# Patient Record
Sex: FEMALE | Race: WHITE | NOT HISPANIC OR LATINO | Employment: FULL TIME | URBAN - METROPOLITAN AREA
[De-identification: names, ages, dates, MRNs, and addresses within clinical notes are randomized per-mention and may not be internally consistent; named-entity substitution may affect disease eponyms.]

---

## 2017-06-23 ENCOUNTER — OFFICE VISIT (OUTPATIENT)
Dept: FAMILY MEDICINE | Facility: CLINIC | Age: 42
End: 2017-06-23

## 2017-06-23 VITALS
HEIGHT: 64 IN | BODY MASS INDEX: 31.24 KG/M2 | DIASTOLIC BLOOD PRESSURE: 89 MMHG | SYSTOLIC BLOOD PRESSURE: 132 MMHG | WEIGHT: 183 LBS | HEART RATE: 92 BPM | TEMPERATURE: 97.5 F

## 2017-06-23 DIAGNOSIS — E03.9 HYPOTHYROIDISM, UNSPECIFIED TYPE: ICD-10-CM

## 2017-06-23 LAB
HEMOGLOBIN: 13.5 G/DL (ref 11.7–15.7)
TSH SERPL DL<=0.05 MIU/L-ACNC: 0.21 UIU/ML (ref 0.3–5)

## 2017-06-23 RX ORDER — CEPHALEXIN 500 MG/1
500 CAPSULE ORAL 2 TIMES DAILY
Qty: 20 CAPSULE | Refills: 0 | Status: SHIPPED | OUTPATIENT
Start: 2017-06-23 | End: 2017-12-28

## 2017-06-23 NOTE — PROGRESS NOTES
Preceptor attestation:  Patient seen and discussed with the resident. I examined patient, was present for and supervised the entire procedure.  Assessment and plan reviewed with resident and agreed upon.  Supervising physician: Brenden Penny  [facility]

## 2017-06-23 NOTE — MR AVS SNAPSHOT
After Visit Summary   6/23/2017    Tiffanie Rosas    MRN: [id number]           Patient Information     Date Of Birth          1975        Visit Information        Provider Department      6/23/2017 4:30 PM Chica Ramos DO [facility]        Today's Diagnoses     Abscess, abdomen (H)    -  1      Care Instructions      Abscess (Incision & Drainage)  An abscess is sometimes called a boil. It happens when bacteria get trapped under the skin and start to grow. Pus forms inside the abscess as the body responds to the bacteria. An abscess can happen with an insect bite, ingrown hair, blocked oil gland, pimple, cyst, or puncture wound.  Your healthcare provider has drained the pus from your abscess. If the abscess pocket was large, your healthcare provider may have put in gauze packing. Your provider will need to remove it on your next visit. He or she may also replace it at that time. You may not need antibiotics to treat a simple abscess, unless the infection is spreading into the skin around the wound (cellulitis).  The wound will take about 1 to 2 weeks to heal, depending on the size of the abscess. Healthy tissue will grow from the bottom and sides of the opening until it seals over.  Home care  These tips can help your wound heal:    The wound may drain for the first 2 days. Cover the wound with a clean dry dressing. Change the dressing if it becomes soaked with blood or pus.    If a gauze packing was placed inside the abscess pocket, you may be told to remove it yourself. You may do this in the shower. Once the packing is removed, you should wash the area in the shower, or clean the area as directed by your provider. Continue to do this until the skin opening has closed. Make sure you wash your hands after changing the packing or cleaning the wound.    If you were prescribed antibiotics, take them as directed until they are all gone.    You may use acetaminophen or ibuprofen to control  pain, unless another pain medicine was prescribed. If you have liver disease or ever had a stomach ulcer, talk with your doctor before using these medicines.  Follow-up care  Follow up with your healthcare provider, or as advised. If a gauze packing was put in your wound, it should be removed in 1 to 2 days. Check your wound every day for any signs that the infection is getting worse. The signs are listed below.  When to seek medical advice  Call your healthcare provider right away if any of these occur:    Increasing redness or swelling    Red streaks in the skin leading away from the wound    Increasing local pain or swelling    Continued pus draining from the wound 2 days after treatment    Fever of 100.4 F (38 C) or higher, or as directed by your healthcare provider    Boil returns when you are at home  Date Last Reviewed: 9/1/2016 2000-2017 [employer]. [address] [place] [address]. All rights reserved. This information is not intended as a substitute for professional medical care. Always follow your healthcare professional's instructions.                Follow-ups after your visit        Who to contact     Please call your clinic at [phone] to:    Ask questions about your health    Make or cancel appointments    Discuss your medicines    Learn about your test results    Speak to your doctor   If you have compliments or concerns about an experience at your clinic, or if you wish to file a complaint, please contact [facility] Patient Relations at [phone] or email us at [email or web address]         Additional Information About Your Visit        MyChart Information     [employer] gives you secure access to your electronic health record. If you see a primary care provider, you can also send messages to your care team and make appointments. If you have questions, please call your primary care clinic.  If you do not have a primary care  "provider, please call [phone] and they will assist you.      [employer] is an electronic gateway that provides easy, online access to your medical records. With [employer], you can request a clinic appointment, read your test results, renew a prescription or communicate with your care team.     To access your existing account, please contact your [facility] or call [phone] for assistance.        Care EveryWhere ID     This is your Care EveryWhere ID. This could be used by other organizations to access your [facility] medical records  [id number]        Your Vitals Were     Pulse Temperature Height Last Period BMI (Body Mass Index)       92 97.5  F (36.4  C) (Oral) 5' 4\" (162.6 cm) (LMP Unknown) 31.41 kg/m2        Blood Pressure from Last 3 Encounters:   06/23/17 132/89   10/07/16 106/75   10/06/16 113/79    Weight from Last 3 Encounters:   06/23/17 183 lb (83 kg)   10/06/16 193 lb 9.6 oz (87.8 kg)   11/12/15 195 lb 1.6 oz (88.5 kg)              Today, you had the following     No orders found for display         Today's Medication Changes          These changes are accurate as of: 6/23/17  4:47 PM.  If you have any questions, ask your nurse or doctor.               Start taking these medicines.        Dose/Directions    cephALEXin 500 MG capsule   Commonly known as:  KEFLEX   Used for:  Abscess, abdomen (H)   Started by:  Chica Ramos DO        Dose:  500 mg   Take 1 capsule (500 mg) by mouth 2 times daily   Quantity:  20 capsule   Refills:  0            Where to get your medicines      These medications were sent to [facility] [address] [place] [address]     Phone:  [phone]     cephALEXin 500 MG capsule                Primary Care Provider Office Phone # Fax #    Carla Nicholas -[phone]       [address] [address]        Equal Access to Services     BEATRIS HUTSON AH: [place] bernal " kedar Patel, wajamaalda lurickadaha, qaybta kajunior sebastian, thomas jean-pierrein hayaan chasebrittani fadumoyadira laMichaelcarlyn dexter. So [facility] [phone].    ATENCIÓN: Si akuala shawn, tiene a phillip disposición servicios gratuitos de asistencia lingüística. Dg al [phone].    We comply with applicable federal civil rights laws and [place] laws. We do not discriminate on the basis of race, color, national origin, age, disability sex, sexual orientation or gender identity.            Thank you!     Thank you for choosing [facility]  for your care. Our goal is always to provide you with excellent care. Hearing back from our patients is one way we can continue to improve our services. Please take a few minutes to complete the written survey that you may receive in the mail after your visit with us. Thank you!             Your Updated Medication List - Protect others around you: Learn how to safely use, store and throw away your medicines at www.disposemymeds.org.          This list is accurate as of: 6/23/17  4:47 PM.  Always use your most recent med list.                   Brand Name Dispense Instructions for use Diagnosis    B-12 1000 MCG Tbcr     100 tablet    Take 1,000 mcg by mouth daily    Vitamin B12 deficiency       cephALEXin 500 MG capsule    KEFLEX    20 capsule    Take 1 capsule (500 mg) by mouth 2 times daily    Abscess, abdomen (H)       cholecalciferol 1000 UNIT tablet    vitamin D    100 tablet    Take 1 tablet (1,000 Units) by mouth daily    Vitamin D deficiency       levonorgestrel 20 MCG/24HR IUD    MIRENA     1 each (20 mcg) by Intrauterine route continuous    Encounter for insertion of mirena IUD       levothyroxine 200 MCG tablet    SYNTHROID/LEVOTHROID    90 tablet    Take 1 tablet (200 mcg) by mouth daily    Other specified hypothyroidism       naproxen 500 MG tablet    NAPROSYN    60 tablet    Take 1 tablet (500 mg) by mouth 2 times daily as needed for moderate pain    Hand joint pain, unspecified laterality

## 2017-06-23 NOTE — PROGRESS NOTES
SUBJECTIVE:  41 year old female presents with abscess formation on right lower abdomen for 3-4 days.  No   fever or chills, palpitations, syncope or SOB.  No history of diabetes. No recent trauma, scratches, wounds or bites prior to abscess formation.    OBJECTIVE:  Fluctuant abscess noted on the right lower abdomen.  Size 0wdx1pl. There is   surrounding induration, erythema and tenderness. Afebrile. Vitals otherwise stable.    ASSESSMENT:  Sebaceous cyst with abscess formation.    PLAN:  After informed consent was obtained, using Betadine for cleansing   and 1% Lidocaine for anesthetic, with sterile   technique, an incision was made into the abscess cavity which was   then drained of purulent material.  The cavity was packed with iodoform guaze.   Procedure well tolerated.  Dressing applied and wound care instructions provided.   May remove packing in 48 hours, and continue frequent warm tub soaks   until abscess resolves. Return to clinic for recheck in 3 days, call sooner in the event of fevers/chills, palpitations, worsening pain.  She was started on keflex 500 BID x10d.    Chica Ramos DO  Procedure supervised by Dr. Brenden Penny

## 2017-06-23 NOTE — PATIENT INSTRUCTIONS
Abscess (Incision & Drainage)  An abscess is sometimes called a boil. It happens when bacteria get trapped under the skin and start to grow. Pus forms inside the abscess as the body responds to the bacteria. An abscess can happen with an insect bite, ingrown hair, blocked oil gland, pimple, cyst, or puncture wound.  Your healthcare provider has drained the pus from your abscess. If the abscess pocket was large, your healthcare provider may have put in gauze packing. Your provider will need to remove it on your next visit. He or she may also replace it at that time. You may not need antibiotics to treat a simple abscess, unless the infection is spreading into the skin around the wound (cellulitis).  The wound will take about 1 to 2 weeks to heal, depending on the size of the abscess. Healthy tissue will grow from the bottom and sides of the opening until it seals over.  Home care  These tips can help your wound heal:    The wound may drain for the first 2 days. Cover the wound with a clean dry dressing. Change the dressing if it becomes soaked with blood or pus.    If a gauze packing was placed inside the abscess pocket, you may be told to remove it yourself. You may do this in the shower. Once the packing is removed, you should wash the area in the shower, or clean the area as directed by your provider. Continue to do this until the skin opening has closed. Make sure you wash your hands after changing the packing or cleaning the wound.    If you were prescribed antibiotics, take them as directed until they are all gone.    You may use acetaminophen or ibuprofen to control pain, unless another pain medicine was prescribed. If you have liver disease or ever had a stomach ulcer, talk with your doctor before using these medicines.  Follow-up care  Follow up with your healthcare provider, or as advised. If a gauze packing was put in your wound, it should be removed in 1 to 2 days. Check your wound every day for any  signs that the infection is getting worse. The signs are listed below.  When to seek medical advice  Call your healthcare provider right away if any of these occur:    Increasing redness or swelling    Red streaks in the skin leading away from the wound    Increasing local pain or swelling    Continued pus draining from the wound 2 days after treatment    Fever of 100.4 F (38 C) or higher, or as directed by your healthcare provider    Boil returns when you are at home  Date Last Reviewed: 9/1/2016 2000-2017 [employer]. [address] [place] [address], [place] [id number]. All rights reserved. This information is not intended as a substitute for professional medical care. Always follow your healthcare professional's instructions.

## 2017-06-26 ENCOUNTER — OFFICE VISIT (OUTPATIENT)
Dept: FAMILY MEDICINE | Facility: CLINIC | Age: 42
End: 2017-06-26

## 2017-06-26 VITALS — TEMPERATURE: 98 F | HEART RATE: 87 BPM | SYSTOLIC BLOOD PRESSURE: 119 MMHG | DIASTOLIC BLOOD PRESSURE: 85 MMHG

## 2017-06-26 DIAGNOSIS — E03.9 HYPOTHYROIDISM, UNSPECIFIED TYPE: ICD-10-CM

## 2017-06-26 DIAGNOSIS — L02.818 CUTANEOUS ABSCESS OF OTHER SITE: Primary | ICD-10-CM

## 2017-06-26 DIAGNOSIS — H69.92 ETD (EUSTACHIAN TUBE DYSFUNCTION), LEFT: ICD-10-CM

## 2017-06-26 NOTE — MR AVS SNAPSHOT
After Visit Summary   6/26/2017    Tiffanie Rosas    MRN: [id number]           Patient Information     Date Of Birth          1975        Visit Information        Provider Department      6/26/2017 11:20 AM Chica Ramos DO [facility]        Today's Diagnoses     Cutaneous abscess of other site    -  1    ETD (eustachian tube dysfunction), left        Hypothyroidism, unspecified type           Follow-ups after your visit        Who to contact     Please call your clinic at [phone] to:    Ask questions about your health    Make or cancel appointments    Discuss your medicines    Learn about your test results    Speak to your doctor   If you have compliments or concerns about an experience at your clinic, or if you wish to file a complaint, please contact [facility] Patient Relations at [phone] or email us at [email or web address]         Additional Information About Your Visit        MyChart Information     [employer] gives you secure access to your electronic health record. If you see a primary care provider, you can also send messages to your care team and make appointments. If you have questions, please call your primary care clinic.  If you do not have a primary care provider, please call [phone] and they will assist you.      [employer] is an electronic gateway that provides easy, online access to your medical records. With [employer], you can request a clinic appointment, read your test results, renew a prescription or communicate with your care team.     To access your existing account, please contact your [facility] or call [phone] for assistance.        Care EveryWhere ID     This is your Care EveryWhere ID. This could be used by other organizations to access your [place] medical records  [id number]        Your Vitals Were     Pulse Temperature Last Period             87 98  F (36.7  C) (Oral) (LMP  Unknown)          Blood Pressure from Last 3 Encounters:   06/26/17 119/85   06/23/17 132/89   10/07/16 106/75    Weight from Last 3 Encounters:   06/23/17 183 lb (83 kg)   10/06/16 193 lb 9.6 oz (87.8 kg)   11/12/15 195 lb 1.6 oz (88.5 kg)              Today, you had the following     No orders found for display       Primary Care Provider Office Phone # Fax #    Carla Nicholas -[phone]       [address] [address]        Equal Access to Services     [facility]: Hadii david bernal hadasho Soomaali, waaxda luqadaha, qaybta kaalmada romulo, thomas torres . So [facility] [phone].    ATENCIÓN: Si habla español, tiene a phillip disposición servicios gratuitos de asistencia lingüística. Ll[facility] [phone].    We comply with applicable federal civil rights laws and [place] laws. We do not discriminate on the basis of race, color, national origin, age, disability sex, sexual orientation or gender identity.            Thank you!     Thank you for choosing [facility]  for your care. Our goal is always to provide you with excellent care. Hearing back from our patients is one way we can continue to improve our services. Please take a few minutes to complete the written survey that you may receive in the mail after your visit with us. Thank you!             Your Updated Medication List - Protect others around you: Learn how to safely use, store and throw away your medicines at www.disposemymeds.org.          This list is accurate as of: 6/26/17 11:44 AM.  Always use your most recent med list.                   Brand Name Dispense Instructions for use Diagnosis    B-12 1000 MCG Tbcr     100 tablet    Take 1,000 mcg by mouth daily    Vitamin B12 deficiency       cephALEXin 500 MG capsule    KEFLEX    20 capsule    Take 1 capsule (500 mg) by mouth 2 times daily    Abscess, abdomen (H)       cholecalciferol 1000 UNIT tablet    vitamin D    100 tablet    Take 1  tablet (1,000 Units) by mouth daily    Vitamin D deficiency       levonorgestrel 20 MCG/24HR IUD    MIRENA     1 each (20 mcg) by Intrauterine route continuous    Encounter for insertion of mirena IUD       levothyroxine 200 MCG tablet    SYNTHROID/LEVOTHROID    90 tablet    Take 1 tablet (200 mcg) by mouth daily    Other specified hypothyroidism       naproxen 500 MG tablet    NAPROSYN    60 tablet    Take 1 tablet (500 mg) by mouth 2 times daily as needed for moderate pain    Hand joint pain, unspecified laterality

## 2017-06-26 NOTE — PROGRESS NOTES
Preceptor attestation:  Patient seen and discussed with the resident. Assessment and plan reviewed with resident and agreed upon.  Supervising physician: Tim Sheridan  [facility]

## 2017-06-26 NOTE — PROGRESS NOTES
"Subjective:   Tiffanie Rosas is a 41 year old female with PMHx of  Patient Active Problem List    Diagnosis Date Noted     Carpal tunnel syndrome 07/14/2014     Priority: Medium     EMG on 7/7/14 with Dr. Montano. Moderate bilateral carpal tunnel.        Anemia 06/26/2014     Priority: Medium     Alcoholism in remission (H) 10/23/2013     Priority: Medium     Obesity 12/04/2012     Priority: Medium     Nicotine addiction 12/04/2012     Priority: Medium     S/P gastric bypass 07/01/2011     Priority: Medium     CARDIOVASCULAR SCREENING; LDL GOAL LESS THAN 160 07/01/2011     Priority: Medium     Hypothyroidism 07/01/2011     Priority: Medium       who presents for recheck. I saw her 3 days ago for a skin abscess on her right lower abdomen. I&D was performed at that time and packing was placed. She was also started on keflex 500 BID. Today she tells me the abscess is much improved and the packing fell out on it's own yesterday. She is tolerating the keflex well and the redness surrounding the abscess has resolved. Denies fevers/chills, palpitations, diaphoresis.     Repeat TSH check last visit was low at 0.2, down from 0.4 8 mos earlier. She is on 200 mcg daily. Again denies palpitations, diaphoresis, insomnia or hair loss.     Her left ear occasionally feels \"plugged\". No other recent illness. No hearing loss.    PMHX/PSHX/MEDS/ALLERGIES/SHX/FHX reviewed and updated in Epic.    Objective:   /85  Pulse 87  Temp 98  F (36.7  C) (Oral)  LMP  (LMP Unknown), There is no height or weight on file to calculate BMI.  Constitutional: healthy, a&o, nad  HEENT: nc/at, PER, neck supple, ENT exam normal   Skin: no suspicious lesions or rashes, I&D site on right lower abdomen is healing appropriately, the previous surrounding erythema has resolved. Wound is clean and dry and packing is no longer present. There is no longer any surrounding induration or fluctuance.  Psychiatric: mentation appears normal and affect " normal/bright     Results for orders placed or performed in visit on 06/23/17   TSH  Sensitive ([facility])   Result Value Ref Range    TSH 0.21 (L) 0.30 - 5.00 uIU/mL    Narrative    Test performed by:  [facility]  [address] [place] [address]   Hemoglobin (HGB) ([facility])   Result Value Ref Range    Hemoglobin 13.5 11.7 - 15.7 g/dL       Assesment & Plan:   Tiffanie Rosas, 41 year old female, with history of I&D for abscess 3 days ago here for recheck.    (L02.818) Cutaneous abscess of other site  (primary encounter diagnosis)  Comment: healing appropriately.  Plan: complete course of keflex    (H69.82) ETD (eustachian tube dysfunction), left  Plan: continue to monitor for now    (E03.9) Hypothyroidism, unspecified type  Comment/Plan: recent TSH slightly low. Will keep synthroid dose the same for now as she denies sx of hyperthyroidism but would consider decreasing to 175 mcg after recheck in 6 weeks if still <1.      I ended our visit today by discussing the patient's diagnoses and recommended treatment. Please refer to today's diagnoses and orders for further details. I briefly discussed the pathophysiology of these conditions and outlined their expected course. I discussed the warning symptoms and signs that indicate an atypical course that would need urgent or emergent care. I also discussed self care strategies for symptom relief. Patient voiced complete understanding of plan of care and was in full agreement to proceed as discussed.    RTC in 6 weeks, for follow up of hypothyroidism or sooner if develops new or worsening symptoms.      Preceptor: Dr. Fatimah Ramos PGY-3

## 2017-07-29 ENCOUNTER — HEALTH MAINTENANCE LETTER (OUTPATIENT)
Age: 42
End: 2017-07-29

## 2017-12-26 DIAGNOSIS — E03.8 OTHER SPECIFIED HYPOTHYROIDISM: ICD-10-CM

## 2017-12-26 RX ORDER — LEVOTHYROXINE SODIUM 200 UG/1
200 TABLET ORAL DAILY
Qty: 30 TABLET | Refills: 0 | Status: SHIPPED | OUTPATIENT
Start: 2017-12-26 | End: 2018-01-05

## 2017-12-26 NOTE — TELEPHONE ENCOUNTER
[facility] phone call message- patient requesting a refill:    Full Medication Name: LEVOTHYROXINE    Dose: 200MG    Pharmacy confirmed as   [facility] [address] [place] [id number] [address] [address]  Phone: [phone] Fax: [phone]  : No: SHE MOVED PLEASE USE [facility] IN [facility] PHONE NUMBER [phone]    Additional Comments: She has been out for three days     OK to leave a message on voice mail? Yes    Primary language: English      needed? No    Call taken on December 26, 2017 at 11:23 AM by Erendira Truong

## 2017-12-28 ENCOUNTER — OFFICE VISIT (OUTPATIENT)
Dept: FAMILY MEDICINE | Facility: CLINIC | Age: 42
End: 2017-12-28
Payer: COMMERCIAL

## 2017-12-28 VITALS
HEIGHT: 65 IN | DIASTOLIC BLOOD PRESSURE: 86 MMHG | BODY MASS INDEX: 30.62 KG/M2 | WEIGHT: 183.8 LBS | HEART RATE: 74 BPM | SYSTOLIC BLOOD PRESSURE: 119 MMHG | OXYGEN SATURATION: 98 % | TEMPERATURE: 98.3 F

## 2017-12-28 DIAGNOSIS — E03.8 OTHER SPECIFIED HYPOTHYROIDISM: Primary | ICD-10-CM

## 2017-12-28 DIAGNOSIS — E03.9 HYPOTHYROIDISM, UNSPECIFIED TYPE: Primary | ICD-10-CM

## 2017-12-28 LAB — TSH SERPL DL<=0.05 MIU/L-ACNC: 0.26 UIU/ML (ref 0.3–5)

## 2017-12-28 NOTE — PATIENT INSTRUCTIONS
- Will follow up next week with TSH results, and discuss instructions on adjusted prescription if necessary. Thanks!

## 2017-12-28 NOTE — PROGRESS NOTES
"[facility] Medicine Clinic Visit    Subjective:  Tiffanie Rosas is a 42 year old female with a PMHx significant for   Patient Active Problem List   Diagnosis     S/P gastric bypass     CARDIOVASCULAR SCREENING; LDL GOAL LESS THAN 160     Hypothyroidism     Obesity     Nicotine addiction     Alcoholism in remission (H)     Anemia     Carpal tunnel syndrome    who presents with concerns about her thyroid prescription.     # Hypothyroidism: She recently requested a refill of her longstanding Synthroid prescription, and was asked to follow up in clinic due to a TSH of 0.21 on 6/23/17. Considered decreasing her dose at that time but this was not followed up on. Currently taking 200mcg daily and tolerating well. Denies any symptoms or concerns, including anxiety, restlessness, palpitations, abdominal issues, vision changes, fatigue, weakness, constipation, hair changes, dyspnea, edema, mood concerns. Of note, she's had a 30 lb weight loss since 2014 which is partly intentional from dietary changes and related to being more active at work (at [employer]). Menses haven't changed though she only has intermittent spotting from her Mirena (placed in 10/2014). Was diagnosed with hypothyroidism at onset of puberty; no known antibody results. No FHx of autoimmune diseases.     Preventative care is UTD.     ROS:   Gen: No fevers, chills. +weight loss  HEENT: No headache, vision changes, hearing loss, swallowing problems   CV: No chest pain, palpitations, peripheral edema  Resp: No SOB, cough, wheezing, congestion, coryza  GI: No constipation, diarrhea, nausea, vomiting, heartburn, change in bowel habits  : No dysuria, hematuria, discharge  MSK: No arthralgias, myalgias  Skin: No rash, lesions    Objective:  Vitals:    12/28/17 1139   BP: 119/86   Pulse: 74   Temp: 98.3  F (36.8  C)   TempSrc: Oral   SpO2: 98%   Weight: 183 lb 12.8 oz (83.4 kg)   Height: 5' 4.5\" (163.8 cm)     Body mass index is 31.06 kg/(m^2).    GEN: NAD, " healthy, alert  EYES: grossly normal to inspection, PERRL, EOMI, normal conjunctivae/sclerae  HENT: normal ear canals/TM's, nose & mouth w/o ulcers or lesions, clear oropharynx, MMM  NECK: no LAD, asymmetry, masses, scars; thyroid normal to palpation  RESP: CTAB, no w/r/r  CV: RRR, nl S1/S2, no S3/S4, no m/r/g, no peripheral edema, peripheral pulses strong  ABD: soft, NT/ND, no hepatosplenomegaly, no masses, no rebound, +BS throughout  MSK: no MSK defects noted, gait is age appropriate w/o ataxia  SKIN: no suspicious lesions or rashes  NEURO: normal strength and tone, sensory exam grossly normal, mentation intact, speech normal  PSYCH: mentation appears normal, affect normal/bright    TSH (12/28/17): 0.26     Assessment/Plan:  Tiffanie was seen today for refill request.    Diagnoses and all orders for this visit:    Other specified hypothyroidism. TSH today remains low. Asymptomatic but does have significant weight loss (though partly intentional). Patient requests a follow up appointment next week to discuss further plans, no changes made today. Will consider adjusting Levothyroxine at that time to achieve euthyroid state. If so, will ask patient to RTC in 4-6 weeks to recheck TSH and retitrate if indicated.     Options for treatment and follow-up care were reviewed with the patient who was engaged and actively involved in the decision making process, verbalized understanding of the options discussed, and satisfied with the final plan.    Patient was staffed with supervising physician, Dr. Rasmussen.     Ray Kebede MD, PGY-1  [facility]

## 2017-12-28 NOTE — MR AVS SNAPSHOT
After Visit Summary   12/28/2017    Tiffanie Rosas    MRN: [id number]           Patient Information     Date Of Birth          1975        Visit Information        Provider Department      12/28/2017 11:20 AM Ray Kebede MD [facility]        Today's Diagnoses     Other specified hypothyroidism    -  1      Care Instructions    - Will call with TSH results in the next few days, and with instructions on adjusted prescription if necessary. Thanks!          Follow-ups after your visit        Who to contact     Please call your clinic at [phone] to:    Ask questions about your health    Make or cancel appointments    Discuss your medicines    Learn about your test results    Speak to your doctor   If you have compliments or concerns about an experience at your clinic, or if you wish to file a complaint, please contact [facility] Patient Relations at [phone] or email us at [email or web address]         Additional Information About Your Visit        MyChart Information     Domot gives you secure access to your electronic health record. If you see a primary care provider, you can also send messages to your care team and make appointments. If you have questions, please call your primary care clinic.  If you do not have a primary care provider, please call [phone] and they will assist you.      Access Closure is an electronic gateway that provides easy, online access to your medical records. With Access Closure, you can request a clinic appointment, read your test results, renew a prescription or communicate with your care team.     To access your existing account, please contact your [facility] Clinic or call [phone] for assistance.        Care EveryWhere ID     This is your Care EveryWhere ID. This could be used by other organizations to access your [place] medical records  [id number]        Your Vitals Were     Pulse  "Temperature Height Last Period Pulse Oximetry BMI (Body Mass Index)    74 98.3  F (36.8  C) (Oral) 5' 4.5\" (163.8 cm) 11/18/2017 (Approximate) 98% 31.06 kg/m2       Blood Pressure from Last 3 Encounters:   12/28/17 119/86   06/26/17 119/85   06/23/17 132/89    Weight from Last 3 Encounters:   12/28/17 183 lb 12.8 oz (83.4 kg)   06/23/17 183 lb (83 kg)   10/06/16 193 lb 9.6 oz (87.8 kg)              Today, you had the following     No orders found for display       Primary Care Provider Office Phone # Fax #    Lata Kendall,  [phone]       [address] [place] [id number]        Equal Access to Services     BEATRIS HUTSON : Hadii aad ku hadashkavon Socirilo, waaxda luqadaha, qaybta kaalmada adebrittaniyaveronica, thomas torres . [facility] [phone].    ATENCIÓN: Si habla español, tiene a phillip disposición servicios gratuitos de asistencia lingüística. [facility] [phone].    We comply with applicable federal civil rights laws and [place] laws. We do not discriminate on the basis of race, color, national origin, age, disability, sex, sexual orientation, or gender identity.            Thank you!     Thank you for choosing [facility]  for your care. Our goal is always to provide you with excellent care. Hearing back from our patients is one way we can continue to improve our services. Please take a few minutes to complete the written survey that you may receive in the mail after your visit with us. Thank you!             Your Updated Medication List - Protect others around you: Learn how to safely use, store and throw away your medicines at www.disposemymeds.org.          This list is accurate as of: 12/28/17 12:01 PM.  Always use your most recent med list.                   Brand Name Dispense Instructions for use Diagnosis    B-12 1000 MCG Tbcr     100 tablet    Take 1,000 mcg by mouth daily    Vitamin B12 deficiency       cephALEXin 500 MG capsule    KEFLEX    20 capsule    " Take 1 capsule (500 mg) by mouth 2 times daily    Abscess, abdomen (H)       cholecalciferol 1000 UNIT tablet    vitamin D3    100 tablet    Take 1 tablet (1,000 Units) by mouth daily    Vitamin D deficiency       levonorgestrel 20 MCG/24HR IUD    MIRENA     1 each (20 mcg) by Intrauterine route continuous    Encounter for insertion of mirena IUD       levothyroxine 200 MCG tablet    SYNTHROID/LEVOTHROID    30 tablet    Take 1 tablet (200 mcg) by mouth daily Needs appointment for further refills.    Other specified hypothyroidism       naproxen 500 MG tablet    NAPROSYN    60 tablet    Take 1 tablet (500 mg) by mouth 2 times daily as needed for moderate pain    Hand joint pain, unspecified laterality

## 2018-01-05 DIAGNOSIS — E03.8 OTHER SPECIFIED HYPOTHYROIDISM: ICD-10-CM

## 2018-01-05 RX ORDER — LEVOTHYROXINE SODIUM 200 UG/1
200 TABLET ORAL DAILY
Qty: 90 TABLET | Refills: 3 | Status: SHIPPED | OUTPATIENT
Start: 2018-01-05 | End: 2019-01-13

## 2018-01-05 NOTE — PROGRESS NOTES
Spoke with patient over the phone about borderline low TSH. We decided to recheck her TSH in 6 months and keep her current dose of Levothyroxine the same since she's tolerating it well. Discussed the risks and benefits, and she will call or return to clinic with any further questions. Refilled her current prescription.     Ray Kebede

## 2018-01-07 NOTE — PROGRESS NOTES
Preceptor attestation:  Patient seen and discussed with the resident. Assessment and plan reviewed with resident and agreed upon.  Supervising physician: Sandra Rasmussen  [facility]

## 2019-01-11 DIAGNOSIS — E03.8 OTHER SPECIFIED HYPOTHYROIDISM: ICD-10-CM

## 2019-01-13 RX ORDER — LEVOTHYROXINE SODIUM 200 UG/1
200 TABLET ORAL DAILY
Qty: 30 TABLET | Refills: 0 | Status: SHIPPED | OUTPATIENT
Start: 2019-01-13 | End: 2019-01-23

## 2019-01-23 DIAGNOSIS — E03.8 OTHER SPECIFIED HYPOTHYROIDISM: ICD-10-CM

## 2019-01-23 RX ORDER — LEVOTHYROXINE SODIUM 200 UG/1
200 TABLET ORAL DAILY
Qty: 30 TABLET | Refills: 0 | Status: SHIPPED | OUTPATIENT
Start: 2019-01-23 | End: 2019-12-06

## 2019-01-31 DIAGNOSIS — E03.8 OTHER SPECIFIED HYPOTHYROIDISM: ICD-10-CM

## 2019-01-31 RX ORDER — LEVOTHYROXINE SODIUM 200 UG/1
200 TABLET ORAL DAILY
Qty: 30 TABLET | Refills: 0 | OUTPATIENT
Start: 2019-01-31

## 2019-03-07 ENCOUNTER — OFFICE VISIT - HEALTHEAST (OUTPATIENT)
Dept: FAMILY MEDICINE | Facility: CLINIC | Age: 44
End: 2019-03-07

## 2019-03-07 DIAGNOSIS — E03.9 HYPOTHYROIDISM, UNSPECIFIED TYPE: ICD-10-CM

## 2019-03-07 DIAGNOSIS — Z98.84 H/O GASTRIC BYPASS: ICD-10-CM

## 2019-03-07 DIAGNOSIS — F10.11 H/O ETOH ABUSE: ICD-10-CM

## 2019-03-07 DIAGNOSIS — D64.9 ANEMIA, UNSPECIFIED TYPE: ICD-10-CM

## 2019-03-07 DIAGNOSIS — Z97.5 IUD (INTRAUTERINE DEVICE) IN PLACE: ICD-10-CM

## 2019-03-07 LAB — TSH SERPL DL<=0.005 MIU/L-ACNC: 0.11 UIU/ML (ref 0.3–5)

## 2019-03-07 ASSESSMENT — MIFFLIN-ST. JEOR: SCORE: 1595.49

## 2019-03-08 ENCOUNTER — COMMUNICATION - HEALTHEAST (OUTPATIENT)
Dept: FAMILY MEDICINE | Facility: CLINIC | Age: 44
End: 2019-03-08

## 2019-03-08 ENCOUNTER — AMBULATORY - HEALTHEAST (OUTPATIENT)
Dept: FAMILY MEDICINE | Facility: CLINIC | Age: 44
End: 2019-03-08

## 2019-03-08 DIAGNOSIS — E03.9 HYPOTHYROIDISM, UNSPECIFIED TYPE: ICD-10-CM

## 2019-03-13 ENCOUNTER — COMMUNICATION - HEALTHEAST (OUTPATIENT)
Dept: FAMILY MEDICINE | Facility: CLINIC | Age: 44
End: 2019-03-13

## 2019-03-14 ENCOUNTER — COMMUNICATION - HEALTHEAST (OUTPATIENT)
Dept: FAMILY MEDICINE | Facility: CLINIC | Age: 44
End: 2019-03-14

## 2019-06-10 ENCOUNTER — COMMUNICATION - HEALTHEAST (OUTPATIENT)
Dept: FAMILY MEDICINE | Facility: CLINIC | Age: 44
End: 2019-06-10

## 2019-06-10 DIAGNOSIS — E03.9 HYPOTHYROIDISM, UNSPECIFIED TYPE: ICD-10-CM

## 2019-11-04 ENCOUNTER — HEALTH MAINTENANCE LETTER (OUTPATIENT)
Age: 44
End: 2019-11-04

## 2019-11-29 ENCOUNTER — OFFICE VISIT (OUTPATIENT)
Dept: FAMILY MEDICINE | Facility: CLINIC | Age: 44
End: 2019-11-29
Payer: COMMERCIAL

## 2019-11-29 VITALS
HEIGHT: 65 IN | WEIGHT: 239.4 LBS | RESPIRATION RATE: 20 BRPM | SYSTOLIC BLOOD PRESSURE: 117 MMHG | TEMPERATURE: 98.1 F | BODY MASS INDEX: 39.89 KG/M2 | HEART RATE: 78 BPM | DIASTOLIC BLOOD PRESSURE: 84 MMHG | OXYGEN SATURATION: 97 %

## 2019-11-29 DIAGNOSIS — D64.9 ANEMIA, UNSPECIFIED TYPE: ICD-10-CM

## 2019-11-29 DIAGNOSIS — Z00.00 ROUTINE GENERAL MEDICAL EXAMINATION AT A HEALTH CARE FACILITY: Primary | ICD-10-CM

## 2019-11-29 DIAGNOSIS — Z23 NEED FOR PROPHYLACTIC VACCINATION AND INOCULATION AGAINST INFLUENZA: ICD-10-CM

## 2019-11-29 DIAGNOSIS — E03.9 HYPOTHYROIDISM, UNSPECIFIED TYPE: ICD-10-CM

## 2019-11-29 LAB
CHOLEST SERPL-MCNC: 149 MG/DL
FASTING?: NORMAL
FERRITIN SERPL-MCNC: 5 NG/ML (ref 10–130)
HBA1C MFR BLD: 5.6 % (ref 4.1–5.7)
HCT VFR BLD AUTO: 40 % (ref 35–47)
HDLC SERPL-MCNC: 59 MG/DL
HEMOGLOBIN: 12.1 G/DL (ref 11.7–15.7)
IRON SERPL-MCNC: 34 UG/DL (ref 42–175)
LDLC SERPL CALC-MCNC: 72 MG/DL
MCH RBC QN AUTO: 24.9 PG (ref 26.5–35)
MCHC RBC AUTO-ENTMCNC: 30.3 G/DL (ref 32–36)
MCV RBC AUTO: 82.5 FL (ref 78–100)
PLATELET # BLD AUTO: 422 K/UL (ref 150–450)
RBC # BLD AUTO: 4.9 M/UL (ref 3.8–5.2)
TRIGL SERPL-MCNC: 89 MG/DL
TSH SERPL DL<=0.05 MIU/L-ACNC: 0.94 UIU/ML (ref 0.3–5)
WBC # BLD AUTO: 6.3 K/UL (ref 4–11)

## 2019-11-29 ASSESSMENT — MIFFLIN-ST. JEOR: SCORE: 1733.85

## 2019-11-29 NOTE — PROGRESS NOTES
Preceptor Attestation:   Patient seen, evaluated and discussed with the resident. I have verified the content of the note, which accurately reflects my assessment of the patient and the plan of care.   Supervising Physician:  Seun Levine MD.

## 2019-11-29 NOTE — PROGRESS NOTES
Female Physical Note    Concerns today: Check thyroid and iron levels    ROS:  CONSTITUTIONAL: no fatigue, no unexpected change in weight  SKIN: no worrisome rashes, no worrisome moles, no worrisome lesions  EYES: no acute vision problems or changes  ENT: no ear problems, no mouth problems, no throat problems  RESP: no significant cough, no shortness of breath  BREAST: no masses, no tenderness, no discharge  CV: no chest pain, no palpitations, no new or worsening peripheral edema  GI: no nausea, no vomiting, no constipation, no diarrhea  : no frequency, no dysuria, no hematuria   female: irregular vaginal bleeding related to uterine fibroids  MS: no claudication, no myalgias, no joint aches  NEURO: no weakness, no dizziness, no syncope, no headaches  ENDOCRINE: no temperature intolerance, no skin/hair changes  HEME: no easy bruising, no bleeding problems  PSYCHIATRIC: NEGATIVE for changes in mood or affect    Contraception: IUD   P: 1  STI: No history  Last Pap Smear Date: 2019 normal  Abnormal Pap History: None    Patient Active Problem List   Diagnosis     S/P gastric bypass     CARDIOVASCULAR SCREENING; LDL GOAL LESS THAN 160     Hypothyroidism     Obesity     Nicotine addiction     Alcoholism in remission (H)     Anemia     Carpal tunnel syndrome       Current Outpatient Medications   Medication Sig Dispense Refill     levonorgestrel (MIRENA) 20 MCG/24HR IUD 1 each (20 mcg) by Intrauterine route continuous       levothyroxine (SYNTHROID/LEVOTHROID) 200 MCG tablet Take 1 tablet (200 mcg) by mouth daily Needs appt for further refills. 30 tablet 0       Past Medical History:   Diagnosis Date     Alcoholism (H)     sober since 2012     Essential hypertension 2012     Thyroid disease         Family History     *Patient is Adopted       Problem (# of Occurrences) Relation (Name,Age of Onset)    Depression (1) Mother: adopted       Negative family history of: Diabetes, Cancer, Heart Disease     "      Problem List Medication List and Allergy List were reviewed and updated.    Patient is an established patient of this clinic..    Social History     Tobacco Use     Smoking status: Current Every Day Smoker     Packs/day: 0.30     Types: Cigarettes     Smokeless tobacco: Never Used   Substance Use Topics     Alcohol use: Not on file     Single  Children ? yes 1 daughter    Has anyone hurt you physically, for example by pushing, hitting, slapping or kicking you or forcing you to have sex? Denies  Do you feel threatened or controlled by a partner, ex-partner or anyone in your life? Denies    RISK BEHAVIORS AND HEALTHY HABITS:  Tobacco Use/Smoking: None  Illicit Drug Use: None  Do you use alcohol? No  Diet (5-7 servings of fruits/veg daily): Yes   Exercise (30 min accumulated most days): No  Dental Care: Yes   Calcium 1500 mg/d:  Yes  Seat Belt Use: Yes     Cholesterol Level (>44 yo or at risk):  Recommended and patient accepted testing.  ASA use (>3% risk in 5 y):  Recommended and patient accepted testing.  Pap/HPV cotest every 5 years for women 30-65   Recommended and patient declined testing (will reschedule with female provider).   HIV screening:  Recommended and patient declined testing.  Breast CA Screening (>39 yo or 10 y before 1st degree relative diagnosis): Recommended and patient declined testing.    Immunization History   Administered Date(s) Administered     Influenza Vaccine, 3 YRS +, IM (QUADRIVALENT W/PRESERVATIVES) 10/14/2014, 11/12/2015, 10/06/2016, 10/07/2017, 11/29/2019     Mantoux Tuberculin Skin Test 10/07/2016     Tdap (Adacel,Boostrix) 06/25/2011    Reviewed Immunization Record Today    EXAMINATION:   /84   Pulse 78   Temp 98.1  F (36.7  C) (Oral)   Resp 20   Ht 1.638 m (5' 4.5\")   Wt 108.6 kg (239 lb 6.4 oz)   LMP 11/26/2019   SpO2 97%   BMI 40.46 kg/m      GENERAL: Awake, alert. Well-nourished, well-developed. No acute distress. Appears comfortable.  HEENT: Head: " Normocephalic and atraumatic; no dysmorphic features. Eyes: Eye lids and lashes normal; pupils equal, round and reactive to light; extra ocular eye movements intact in all directions; no scleral icterus or conjunctival injection. Ears: Pinnae appear normal; external auditory canals patent; tympanic membranes pearly and opaque without erythema, effusion or bulging. Oropharynx: mucus membranes pink and moist; tonsils without enlargement, erythema or exudates.  NECK: Supple and symmetric. Trachea midline. No anterior or posterior cervical lymphadenopathy, no supraclavicular lymphadenopathy. Thyroid symmetric and without enlargement or tenderness. Skin normal.  SKIN: Warm and dry. No rashes, lesions, erythema, petechiae, purpura, ecchymosis, or jaundice.  LUNGS: No increased work of breathing; good air movement throughout all lung fields; breath sounds clear to auscultation bilaterally throughout all lung fields with no crackles or wheezing.  CARDIOVASCULAR: Regular rate and rhythm; normal S1 and S2; no S3 or S4; no murmur, rub or click. Radial and dorsalis pedis/posterior tibial pulses intact; normal capillary refill. No peripheral edema.  ABDOMEN: Non-distended. Soft and non-tender in all quadrants; no masses or hepatosplenomegally.  BACK: Symmetric, no curvature. Cervical, thoracic and lumbar spinous processes are non-tender to palpation; paraspinous muscles are non-tender to palpation. No costal vertebral tenderness.  MUSCULOSKELETAL: No gross deformity, erythema, warmth or effusion of the joints. Full range of motion throughout the shoulders, elbows, wrists, hips, knees and ankles.  NEUROLOGIC: Awake, alert. Cranial nerves II-XII are intact. Sensation to light touch is intact throughout the upper and lower extremities. Motor strength is 5/5 throughout the upper and lower extremities bilaterally. Cerebellar testing including finger-nose-finger and heel-knee-shin is intact. Gait is normal.  PSYCH: Normal affect,  mood, orientation, memory and insight.      ASSESSMENT:    1. Routine general medical examination at a health care facility  Overall doing well.  Medical history, surgical history, medications, allergies, social history, and family history were reviewed and updated.  Immunization schedule was reviewed.  Overall doing well.  Does have history of hypothyroidism on levothyroxine and history of anemia.  Also history of obesity with history of Felipe-en-Y gastric bypass surgery.  We will check TSH with reflex T4, CBC, iron studies, hemoglobin A1c, and lipid panel today.  Patient is not due for colorectal cancer screening nor breast cancer screening at this time.  - Thyroid [place] ([facility])  - CBC with Plt ([facility])  - Hemoglobin A1c ([facility])  - Iron ([facility])  - Ferritin ([facility])  - Lipid [place] ([facility]) - Results > 1 hr    2. Need for prophylactic vaccination and inoculation against influenza  Patient due for influenza vaccination today.  Influenza vaccine was administered.  - Fluzone quad, multidose 0.5ml, 6+ months [57896]    3. Hypothyroidism, unspecified type  Patient has history of hypothyroidism on levothyroxine 200 mcg daily.  Last TSH level was 0.11 in March 2019.  Asymptomatic today.  - Thyroid [place] ([facility])    4. Anemia, unspecified type  Patient with history of anemia.  Per chart review last hemoglobin was 13.5.  Patient does report history of irregular uterine bleeding secondary to uterine fibroids.  She does follow with [facility] for this.  Given her concern about anemia and her history of irregular uterine bleeding secondary to the uterine fibroids we will check hemoglobin and iron studies today.  - CBC with Plt ([facility])  - Iron ([facility])  - Ferritin ([facility])    Patient was discussed with attending physician, Dr. Seun Levine, who agrees with the assessment and plan.    Vincent Morocho, PGY-2  [facility]  11/29/2019

## 2019-11-29 NOTE — PATIENT INSTRUCTIONS
Preventive Health Recommendations  Female Ages 40 to 49    Yearly exam:     See your health care provider every year in order to  1. Review health changes.   2. Discuss preventive care.    3. Review your medicines if your doctor prescribed any.      Get a Pap test every three years (unless you have an abnormal result and your provider advises testing more often).      If you get Pap tests with HPV test, you only need to test every 5 years, unless you have an abnormal result. You do not need a Pap test if your uterus was removed (hysterectomy) and you have not had cancer.      You should be tested each year for STDs (sexually transmitted diseases), if you're at risk.     Ask your doctor if you should have a mammogram.      Have a colonoscopy (test for colon cancer) if someone in your family has had colon cancer or polyps before age 50.       Have a cholesterol test every 5 years.       Have a diabetes test (fasting glucose) after age 45. If you are at risk for diabetes, you should have this test every 3 years.    Shots: Get a flu shot each year. Get a tetanus shot every 10 years.     Nutrition:     Eat at least 5 servings of fruits and vegetables each day.    Eat whole-grain bread, whole-wheat pasta and brown rice instead of white grains and rice.    Get adequate Calcium and Vitamin D.      Lifestyle    Exercise at least 150 minutes a week (an average of 30 minutes a day, 5 days a week). This will help you control your weight and prevent disease.    Limit alcohol to one drink per day.    No smoking.     Wear sunscreen to prevent skin cancer.    See your dentist every six months for an exam and cleaning.

## 2019-12-05 NOTE — RESULT ENCOUNTER NOTE
I spoke with the Patient via telephone call and communicated these results.     Vincent Morocho MD

## 2019-12-06 ENCOUNTER — MYC REFILL (OUTPATIENT)
Dept: FAMILY MEDICINE | Facility: CLINIC | Age: 44
End: 2019-12-06

## 2019-12-06 ENCOUNTER — COMMUNICATION - HEALTHEAST (OUTPATIENT)
Dept: FAMILY MEDICINE | Facility: CLINIC | Age: 44
End: 2019-12-06

## 2019-12-06 DIAGNOSIS — E03.8 OTHER SPECIFIED HYPOTHYROIDISM: ICD-10-CM

## 2019-12-06 DIAGNOSIS — E03.9 HYPOTHYROIDISM, UNSPECIFIED TYPE: ICD-10-CM

## 2019-12-06 RX ORDER — LEVOTHYROXINE SODIUM 200 UG/1
200 TABLET ORAL DAILY
Qty: 30 TABLET | Refills: 0 | Status: SHIPPED | OUTPATIENT
Start: 2019-12-06 | End: 2020-04-10

## 2019-12-06 NOTE — TELEPHONE ENCOUNTER
Refill for levothyroxine sent to the patient's preferred pharmacy.    Vincent Morocho MD  December 6, 2019

## 2019-12-19 ENCOUNTER — HOSPITAL ENCOUNTER (OUTPATIENT)
Dept: ULTRASOUND IMAGING | Facility: CLINIC | Age: 44
Discharge: HOME | End: 2019-12-19

## 2019-12-19 ENCOUNTER — RECORDS - HEALTHEAST (OUTPATIENT)
Dept: ADMINISTRATIVE | Facility: OTHER | Age: 44
End: 2019-12-19

## 2019-12-19 ENCOUNTER — OFFICE VISIT (OUTPATIENT)
Dept: FAMILY MEDICINE | Facility: CLINIC | Age: 44
End: 2019-12-19
Payer: COMMERCIAL

## 2019-12-19 VITALS
RESPIRATION RATE: 12 BRPM | HEART RATE: 85 BPM | BODY MASS INDEX: 41.11 KG/M2 | SYSTOLIC BLOOD PRESSURE: 114 MMHG | OXYGEN SATURATION: 100 % | TEMPERATURE: 98.6 F | WEIGHT: 240.8 LBS | DIASTOLIC BLOOD PRESSURE: 82 MMHG | HEIGHT: 64 IN

## 2019-12-19 DIAGNOSIS — Z97.5 ATTEMPTED IUD REMOVAL, UNSUCCESSFUL: ICD-10-CM

## 2019-12-19 DIAGNOSIS — Z53.8 ATTEMPTED IUD REMOVAL, UNSUCCESSFUL: ICD-10-CM

## 2019-12-19 DIAGNOSIS — Z12.4 ENCOUNTER FOR SCREENING FOR CERVICAL CANCER: Primary | ICD-10-CM

## 2019-12-19 DIAGNOSIS — Z30.432 ENCOUNTER FOR IUD REMOVAL: ICD-10-CM

## 2019-12-19 ASSESSMENT — MIFFLIN-ST. JEOR: SCORE: 1739.39

## 2019-12-19 NOTE — LETTER
2019      Tiffanie Rosas  [address]  [facility] [address]        Dear Tiffanie,    [facility]  [phone]    [address] [address]    Appointment:  TODAY  Arrival Time:  1:30pm    Please bring a copy of your insurance card and photo ID    If you cannot make this appointment please call [phone] to reschedule    Sincerely,    Betty New

## 2019-12-19 NOTE — LETTER
December 31, 2019      [address]   [facility] [address]        Dear Tiffanie,    Normal pap. Repeat co-testing in 5 years.     Please see below for your test results.    Resulted Orders   GYN Cytology ([facility])   Result Value Ref Range    Lab AP Case Report SEE RESULTS BELOW       Comment:      Gynecologic Cytology Report                       Case: [id number]                                     Authorizing Provider:  Seun Levine MD       Collected:             12/19/2019 1027              Ordering Location:      [facility] Received:              12/20/2019 0901              First Screen:          Jaleel Lim CT                                                                             (ASCP)                                                                         Specimen:    SUREPATH PAP, SCREENING, Endocervical/cervical                                               Lab AP Gyn Interpretation SEE RESULTS BELOW Negative for squamous intraepithelial le      Comment:      Negative for squamous intraepithelial lesion or malignancy  Electronically signed by Jaleel Lim CT (ASCP) on 12/31/2019 at    9:09 AM      Lab AP Malignant? Normal Normal    Lab AP Gyn Other Findings       Shift in vaginal billie suggestive of bacterial vaginosis    Specimen adequacy:       Satisfactory for evaluation, endocervical/transformation zone component present    HPV Reflex? Yes regardless of result     High Risk? No     Last Mens Period 11/28/19     Lab AP Abnormal Bleeding No     Lab AP Patient Status Mirena IUD     Lab AP Birth Control/Hormones IUD-Hormone     Lab AP Previous Normal 2013     Lab AP Previous Abnl No     Lab AP Cervical Appearance Normal     Narrative    Test performed by:  [facility] [address] [place] [address]   HPV [place] PCR ([facility])   Result Value Ref Range    HPV Source SurePath     HPV 16 DNA Negative NEG    HPV 18 DNA  Negative NEG    Other HR HPV Negative NEG    Final Diagnosis SEE NOTES       Comment:      This patient's sample is negative for HPV DNA.  This test was developed and its performance characteristics determined by the  [facility]. It  has not been cleared or approved by the FDA. The laboratory is regulated under  CLIA as qualified to perform high-complexity testing. This test is used for  clinical purposes. It should not be regarded as investigational or for  research.  (Note)  METHODOLOGY:  The Roche april 4800 system uses automated extraction,  simultaneous amplification of HPV (L1 region) and beta-globin,  followed by  real time detection of fluorescent labeled HPV and beta  globin using specific oligonucleotide probes . The test specifically  identifies types HPV 16 DNA and HPV 18 DNA while concurrently  detecting the rest of the high risk types (31, 33, 35, 39, 45, 51,  52, 56, 58, 59, 66 or 68).     COMMENTS:  This test is not intended for use as a screening device  for women under age 30 with normal cervical   cytology.  Results should  be correlated with cytologic and histologic findings. Close clinical  followup is recommended.         Specimen Description Cervical Cells       Comment:         Performed and/or entered by:  [address] [address]       Narrative    Test performed by:  [facility]  [address], [place] [address]       If you have any questions, please call the clinic to make an appointment.    Sincerely,    Barbara Candelaria MD

## 2019-12-19 NOTE — PATIENT INSTRUCTIONS
Thank you for coming in!    Your pap results will be available in the next 48-72 hours.    Since we could not find your IUD strings today, we will have you go for a pelvic ultrasound. If the mirena is still in place, we will refer you to OB/GYN for removal of your IUD.    Please call with any questions!    Thank you,  Dr. [facility]  [phone]  Fax Orders to [phone]    [address] [address]    Appointment:  TODAY   Arrival Time:  1:30pm    Please bring a copy of your insurance card and photo ID    If you cannot make this appointment please call [phone] to reschedule    Order faxed to [facility] Scheduling at [phone].     Betty New

## 2019-12-19 NOTE — PROGRESS NOTES
ASSESSMENT AND PLAN     1. Encounter for screening for cervical cancer   Last pap smear was in 2013 and normal. Has not had abnormal bleeding. IUD Mirena in place since 2014. LMP was 11/29/19 and has normal bleeding during menses. No recent intercourse. Declined STD/STI screening today or wet prep.   - GYN Cytology ([facility])    2. Encounter for IUD removal  Requested removal of IUD Mirena which was placed in 2013. Patient denies any dislodging or has noticed the Mirena becoming dislodged. Has not been able to personally feel the strings. During exam, unable to visualize IUD strings, therefore, unable to remove the IUD. Likely, either IUD dislodged and patient did not notice, or the IUD has been placed further in the cervical/uterine tract. Will obtain a pelvic ultrasound and if able to be visualized, will refer to OB/GYN. If not, likely confirms that IUD is no longer in uterus.   - US PELVIS COMPLETE; Future      RTC as needed.     The patient was seen by, and discussed with, Dr. Seun Levine, who agrees with the plan.    Barbara Candelaria MD PGY2  [facility]         [facility]       Tiffanie Rosas is a 44 year old  female with a PMH significant for:     Patient Active Problem List   Diagnosis     S/P gastric bypass     CARDIOVASCULAR SCREENING; LDL GOAL LESS THAN 160     Hypothyroidism     Obesity     Nicotine addiction     Alcoholism in remission (H)     Anemia     Carpal tunnel syndrome     She presents for a cervical cancer screening and IUD removal.    Last cervical cancer screening in 2013 and was normal. No history of abnormal pap smears previously. No history of irregular bleeding. Had Mirena placed in 2014. LMP: 11/29/19. Has only a couple days of bleeding. Has bleeding every 30 days. No history of STIs. No recent intercourse. Denies vaginal discharge, abdominal pain, nausea/vomiting, headache.       PMH, Medications and Allergies were reviewed and updated as needed.        REVIEW OF SYSTEMS  "    ROS reviewed in HPI.         OBJECTIVE     Vitals:    12/19/19 0803   BP: 114/82   BP Location: Left arm   Pulse: 85   Resp: 12   Temp: 98.6  F (37  C)   TempSrc: Oral   SpO2: 100%   Weight: 109.2 kg (240 lb 12.8 oz)   Height: 1.637 m (5' 4.45\")     Body mass index is 40.76 kg/m .    General: Alert, well appearing, no acute distress  Abdomen: Soft, nontender, non-distended, no masses palpated, normal bowel sounds  : Vagina/labial folds normal. Cervical os closed, no friable tissue, no abnormal discharge, pink in appearance. Unable to visualize IUD strings.   Skin: Dry, no cyanosis, no abrasions, no discoloration.      No results found for this or any previous visit (from the past 24 hour(s)).        "

## 2019-12-20 LAB
FINAL DIAGNOSIS: NORMAL
HPV 16 DNA: NEGATIVE
HPV 18 DNA: NEGATIVE
HPV SOURCE: NORMAL
OTHER HR HPV: NEGATIVE
SPECIMEN DESCRIPTION: NORMAL

## 2019-12-23 DIAGNOSIS — Z30.432 ENCOUNTER FOR IUD REMOVAL: Primary | ICD-10-CM

## 2019-12-26 NOTE — PROGRESS NOTES
OB/GYN REFERRAL  [facility]  Phone: [phone]  Fax: [phone]    [facility]  [address], [address]  [place] [address]    [facility]  [address], [address]  [place] [address]    [facility]  [address], [address]  [place] [address]    Faxed referral over called and LM for patients to contact [facility] [place] or us to make an appointment for her.           Please bring a copy of your insurance card and photo ID    If you cannot make this appointment please call [phone] to reschedule     Referral, demographics, labs and office note faxed to [phone].

## 2019-12-31 ENCOUNTER — RECORDS - HEALTHEAST (OUTPATIENT)
Dept: ADMINISTRATIVE | Facility: OTHER | Age: 44
End: 2019-12-31

## 2019-12-31 LAB
CYTOLOGY CVX/VAG DOC THIN PREP: NORMAL
HIGH RISK?: NO
HPV REFLEX?: NORMAL
LAB AP ABNORMAL BLEEDING: NO
LAB AP BIRTH CONTROL/HORMONES: NORMAL
LAB AP CERVICAL APPEARANCE: NORMAL
LAB AP GYN OTHER FINDINGS: NORMAL
LAB AP PATIENT STATUS: NORMAL
LAB AP PREVIOUS ABNL: NO
LAB AP PREVIOUS NORMAL: 2013
LAST MENS PERIOD: NORMAL
PATH REPORT.COMMENTS IMP SPEC: NORMAL
PATH REPORT.COMMENTS IMP SPEC: NORMAL
SPECIMEN ADEQUACY:: NORMAL

## 2020-01-21 ENCOUNTER — TRANSFERRED RECORDS (OUTPATIENT)
Dept: HEALTH INFORMATION MANAGEMENT | Facility: CLINIC | Age: 45
End: 2020-01-21

## 2020-04-02 ENCOUNTER — TELEPHONE (OUTPATIENT)
Dept: FAMILY MEDICINE | Facility: CLINIC | Age: 45
End: 2020-04-02

## 2020-04-02 NOTE — TELEPHONE ENCOUNTER
Reached out to patient during COVID19 Clinic outreach. Reassured patient that [facility] is still open and has started implementing phone and video appointments to help patient remain safe at home.     Patient reports no concerns at this time and is feeling healthy.     Offered MyChart. Patient accepted. Patient is already set up with MyChart.     KIMBERLY Meek

## 2020-04-10 DIAGNOSIS — E03.8 OTHER SPECIFIED HYPOTHYROIDISM: ICD-10-CM

## 2020-04-10 RX ORDER — LEVOTHYROXINE SODIUM 200 UG/1
TABLET ORAL
Qty: 30 TABLET | Refills: 0 | Status: SHIPPED | OUTPATIENT
Start: 2020-04-10 | End: 2020-05-11

## 2020-05-11 DIAGNOSIS — E03.8 OTHER SPECIFIED HYPOTHYROIDISM: ICD-10-CM

## 2020-05-11 RX ORDER — LEVOTHYROXINE SODIUM 200 UG/1
TABLET ORAL
Qty: 30 TABLET | Refills: 0 | Status: SHIPPED | OUTPATIENT
Start: 2020-05-11 | End: 2020-06-16

## 2020-06-16 DIAGNOSIS — E03.8 OTHER SPECIFIED HYPOTHYROIDISM: ICD-10-CM

## 2020-06-16 RX ORDER — LEVOTHYROXINE SODIUM 200 UG/1
TABLET ORAL
Qty: 30 TABLET | Refills: 0 | Status: SHIPPED | OUTPATIENT
Start: 2020-06-16 | End: 2020-07-16

## 2020-07-16 DIAGNOSIS — E03.8 OTHER SPECIFIED HYPOTHYROIDISM: ICD-10-CM

## 2020-07-16 RX ORDER — LEVOTHYROXINE SODIUM 200 UG/1
TABLET ORAL
Qty: 30 TABLET | Refills: 0 | Status: SHIPPED | OUTPATIENT
Start: 2020-07-16 | End: 2020-08-21

## 2020-08-20 DIAGNOSIS — E03.8 OTHER SPECIFIED HYPOTHYROIDISM: ICD-10-CM

## 2020-08-21 RX ORDER — LEVOTHYROXINE SODIUM 200 UG/1
TABLET ORAL
Qty: 30 TABLET | Refills: 0 | Status: SHIPPED | OUTPATIENT
Start: 2020-08-21 | End: 2020-09-16

## 2020-09-04 ENCOUNTER — OFFICE VISIT (OUTPATIENT)
Dept: FAMILY MEDICINE | Facility: CLINIC | Age: 45
End: 2020-09-04
Payer: COMMERCIAL

## 2020-09-04 VITALS
DIASTOLIC BLOOD PRESSURE: 75 MMHG | OXYGEN SATURATION: 96 % | TEMPERATURE: 98.3 F | SYSTOLIC BLOOD PRESSURE: 107 MMHG | HEART RATE: 71 BPM | RESPIRATION RATE: 16 BRPM

## 2020-09-04 DIAGNOSIS — Z23 NEED FOR PROPHYLACTIC VACCINATION AND INOCULATION AGAINST INFLUENZA: Primary | ICD-10-CM

## 2020-09-04 DIAGNOSIS — R09.A2 GLOBUS SENSATION: ICD-10-CM

## 2020-09-04 DIAGNOSIS — E03.9 HYPOTHYROIDISM, UNSPECIFIED TYPE: ICD-10-CM

## 2020-09-04 LAB — TSH SERPL DL<=0.05 MIU/L-ACNC: 1.77 UIU/ML (ref 0.3–5)

## 2020-09-04 NOTE — PROGRESS NOTES
[facility]         SUBJECTIVE   Tiffanie Rosas is a 45 year old female with a PMH of:  Patient Active Problem List   Diagnosis     S/P gastric bypass     CARDIOVASCULAR SCREENING; LDL GOAL LESS THAN 160     Hypothyroidism     Obesity     Nicotine addiction     Alcoholism in remission (H)     Anemia     Carpal tunnel syndrome     presenting to clinic today with a chief complaint of 6-day history of globus sensation.  She feels like there is something stuck in the back of her throat.  This is not associated with drinking liquids or eating solids.  It occurs intermittently.  She has not had any difficulty swallowing.  She is able to manage her secretions.  She denies any history of trauma or choking on anything.  It is not associated with any pain.  She does have a history of hypothyroidism, so she would like to have her thyroid examined.    PMH, Medications and Allergies were reviewed and updated as needed.    ROS:  General: No fevers, chills  Head: No headache  Ears: No acute change in hearing.    CV: No chest pain or palpitations.  Resp: No shortness of breath.  No cough. No hemoptysis.  GI: No nausea, vomiting, constipation, diarrhea  : No urinary pains    Patient Active Problem List   Diagnosis     S/P gastric bypass     CARDIOVASCULAR SCREENING; LDL GOAL LESS THAN 160     Hypothyroidism     Obesity     Nicotine addiction     Alcoholism in remission (H)     Anemia     Carpal tunnel syndrome     Current Outpatient Medications   Medication Sig Dispense Refill     EUTHYROX 200 MCG tablet Take 1 tablet by mouth once daily 30 tablet 0          OBJECTIVE:   Vitals:   Vitals:    09/04/20 1544   BP: 107/75   BP Location: Left arm   Patient Position: Sitting   Cuff Size: Adult Large   Pulse: 71   Resp: 16   Temp: 98.3  F (36.8  C)   TempSrc: Oral   SpO2: 96%     Gen:  Well nourished and in no acute distress  HEENT: Extraocular movement intact.    Neck: Supple without lymphadenopathy, No thyroid  megaly or nodules palpable.  CV:  RRR  - no murmurs noted   Pulm:  CTAB, no wheezes or crackles noted, good air entry   ABD: Soft, nontender, no masses, no rebound, BS intact throughout, no hepatosplenomegaly  Extrem: No cyanosis, edema or clubbing  Psych: Euthymic           ASSESSMENT and PLAN:   Tiffanie was seen today for thyroid disease, medication reconciliation and imm/inj.    Diagnoses and all orders for this visit:    Need for prophylactic vaccination and inoculation against influenza  -     Fluzone quad, preserve-free/prefilled  0.5ml, 6+ months [46441]    Hypothyroidism, unspecified type  -     TSH  Sensitive ([facility])    Globus sensation    Unknown etiology of globus sensation.  Discussed that if symptoms do not improve on their own, would recommend referral to GI for upper endoscopy.  Patient prefers watchful waiting at this point.    Return to clinic in 2 week for follow up of globus sensation. Return sooner if develops new or worsening symptoms.    Options for treatment and/or follow-up care were reviewed with the patient was actively involved in the decision making process. Patient verbalized understanding and was in agreement with the plan.    The patient was seen by and discussed with Gwendolyn Clarke MD.  Brandie Valdes MD PGY3

## 2020-09-04 NOTE — PROGRESS NOTES
Preceptor Attestation:    Patient seen and evaluated in person. I discussed the patient with the resident. I have verified the content of the note, which accurately reflects my assessment of the patient and the plan of care.   Supervising Physician:  Melony Clarke MD.

## 2020-09-16 ENCOUNTER — MYC REFILL (OUTPATIENT)
Dept: FAMILY MEDICINE | Facility: CLINIC | Age: 45
End: 2020-09-16

## 2020-09-16 DIAGNOSIS — E03.8 OTHER SPECIFIED HYPOTHYROIDISM: ICD-10-CM

## 2020-09-21 RX ORDER — LEVOTHYROXINE SODIUM 200 UG/1
200 TABLET ORAL DAILY
Qty: 90 TABLET | Refills: 3 | Status: SHIPPED | OUTPATIENT
Start: 2020-09-21 | End: 2021-09-01

## 2020-09-21 RX ORDER — LEVOTHYROXINE SODIUM 200 UG/1
200 TABLET ORAL DAILY
Qty: 30 TABLET | Refills: 0 | Status: SHIPPED | OUTPATIENT
Start: 2020-09-21 | End: 2020-09-21

## 2021-01-15 ENCOUNTER — HEALTH MAINTENANCE LETTER (OUTPATIENT)
Age: 46
End: 2021-01-15

## 2021-06-02 ENCOUNTER — RECORDS - HEALTHEAST (OUTPATIENT)
Dept: ADMINISTRATIVE | Facility: CLINIC | Age: 46
End: 2021-06-02

## 2021-06-02 VITALS — WEIGHT: 210 LBS | HEIGHT: 65 IN | BODY MASS INDEX: 34.99 KG/M2

## 2021-06-04 NOTE — TELEPHONE ENCOUNTER
Refill Approved    Rx renewed per Medication Renewal Policy. Medication was last renewed on 6/11/19.    Myrna Lai, [facility] Triage/Med Refill 12/7/2019     Requested Prescriptions   Pending Prescriptions Disp Refills     levothyroxine (SYNTHROID, LEVOTHROID) 200 MCG tablet [Pharmacy Med Name: LEVOTHYROXIN 200MCG TAB] 90 tablet 1     Sig: TAKE 1 TABLET BY MOUTH ONCE DAILY       Thyroid Hormones Protocol Passed - 12/7/2019  3:42 PM        Passed - Provider visit in past 12 months or next 3 months     Last office visit with prescriber/PCP: 3/7/2019 Rodrick Finney MD OR same dept: 3/7/2019 Rodrick Finney MD OR same specialty: 3/7/2019 Rodrick Finney MD  Last physical: Visit date not found Last MTM visit: Visit date not found   Next visit within 3 mo: Visit date not found  Next physical within 3 mo: Visit date not found  Prescriber OR PCP: Rodrick Fniney MD  Last diagnosis associated with med order: 1. Hypothyroidism, unspecified type  - levothyroxine (SYNTHROID, LEVOTHROID) 200 MCG tablet [Pharmacy Med Name: LEVOTHYROXIN 200MCG TAB]; TAKE 1 TABLET BY MOUTH ONCE DAILY  Dispense: 90 tablet; Refill: 1    If protocol passes may refill for 12 months if within 3 months of last provider visit (or a total of 15 months).             Passed - TSH on file in past 12 months for patient age 12 & older     TSH   Date Value Ref Range Status   11/29/2019 0.94 0.30 - 5.00 uIU/mL Final

## 2021-06-19 NOTE — LETTER
Letter by Rodrick Finney MD at      Author: Rodrick Finney MD Service: -- Author Type: --    Filed:  Encounter Date: 3/8/2019 Status: (Other)       [place] [id number]            March 14, 2019    Dear Tiffanie,    Your TSH level was too low. This means that your current levothyroxine dosage is too high. You should decrease your dose to 175 mg daily. A presrciption was sent to the pharmacy for you. You should recheck your TSH level in 6 weeks. There is an order for you to come in for lab only appointment to check that. Please call and schedule that appointment or call us if you have any further questions.     Thank you,    Electronically signed by Miriam Warren MD           Recent Results (from the past 240 hour(s))   Thyroid Stimulating Hormone (TSH)   Result Value Ref Range    TSH 0.11 (L) 0.30 - 5.00 uIU/mL

## 2021-06-24 NOTE — TELEPHONE ENCOUNTER
DOD  Patient has not read the [employer] message that was sent yesterday. Writer also attempted to call patient again, there was no answer so writer left a voicemail for the patient to return our phone call and look at her [employer] message. Is it okay to send a letter? Please advise

## 2021-06-24 NOTE — PROGRESS NOTES
Please let the patient know that her TSH was too low.  That means that her dose of levothyroxine is too high.    She should decrease her dose of levothyroxine:  -OLD dose: Levothyroxine 200 mcg daily.  -NEW dose: Levothyroxine 175 mcg daily.    A prescription was sent for the levothyroxine 175 mcg.    She should recheck her TSH in 6 weeks.  There is a future lab order for that.  Please schedule lab only appointment for 6 weeks.

## 2021-06-24 NOTE — TELEPHONE ENCOUNTER
Patient as a [employer] account will send a message. Will wait to see if patient read the message if not will send the message to get a approval for a letter.

## 2021-06-24 NOTE — TELEPHONE ENCOUNTER
Called and left message to call clinic back. Upon returning called okay to relay Dr. Warren' message below.

## 2021-06-24 NOTE — PROGRESS NOTES
"Subjective: This patient comes in clinic for the first time.  She is been followed [facility] prior to this    She has a past medical history of gastric bypass and carpal tunnel for surgeries    Regarding medical history she is had hypothyroid also anemia which she said was resolved but now feels like she has some iron deficiency again.    She had a history of alcohol abuse and quit drinking in 2012    She is  has one child    Patient is allergic to Septra    Medications include the levothyroxine 200 mcg daily she is been on that dose for a long time    She also has an IUD  (Mirena), which was placed in 2014    She is not sure if she is changing clinics but comes in today for refill on her thyroid and get a TSH checked    Presently she does not have insurance so she did not want to do a lot additional preventative care measures.  We did discuss that.    Tobacco status: She  reports that she quit smoking about 4 years ago. Her smoking use included cigarettes. She started smoking about 7 years ago. she has never used smokeless tobacco.    There are no active problems to display for this patient.      Current Outpatient Medications   Medication Sig Dispense Refill     levothyroxine (SYNTHROID, LEVOTHROID) 200 MCG tablet 1 po qd 90 tablet 1     No current facility-administered medications for this visit.        ROS: 10 point review of systems positive as outlined otherwise negative    Objective:    /60 (Patient Site: Left Arm, Patient Position: Sitting, Cuff Size: Adult Large)   Pulse 70   Temp 98  F (36.7  C) (Oral)   Resp 12   Ht 5' 4.5\" (1.638 m)   Wt 210 lb (95.3 kg)   LMP 03/03/2019   BMI 35.49 kg/m    Body mass index is 35.49 kg/m .      General appearance no acute distress  Vital signs are stable her weight is up from 183 back in December 2017.  That was the time of her last TSH.    TSH at that time was 0.26   HEENT: Oropharynx is clear pupils react normally    Lungs are " clear no rales or rhonchi neck without abnormality    Heart was regular S1-S2    Extremities without edema    Assessment:  1. Hypothyroidism, unspecified type  levothyroxine (SYNTHROID, LEVOTHROID) 200 MCG tablet    Thyroid Stimulating Hormone (TSH)   2. H/O gastric bypass     3. H/O ETOH abuse     4. IUD (intrauterine device) in place      2014   5. Anemia, unspecified type       Hypothyroid.  Prescription for levothyroxine 200 mcg daily, check TSH patient will be contacted    Previous history of gastric bypass previous history of alcohol abuse    Presently has IUD in place    Regarding the anemia will empirically start ferrous sulfate 325 mg 1 a day.  Check an iron studies and CBC when she follows up when she has insurance    Plan: As outlined above    This transcription uses voice recognition software, which may contain typographical errors.

## 2021-06-24 NOTE — TELEPHONE ENCOUNTER
Miriam Warren MD at 3/8/2019 11:27 AM     Status: Signed      Please let the patient know that her TSH was too low.  That means that her dose of levothyroxine is too high.     She should decrease her dose of levothyroxine:  -OLD dose: Levothyroxine 200 mcg daily.  -NEW dose: Levothyroxine 175 mcg daily.     A prescription was sent for the levothyroxine 175 mcg.     She should recheck her TSH in 6 weeks.  There is a future lab order for that.  Please schedule lab only appointment for 6 weeks.

## 2021-08-25 ENCOUNTER — TELEPHONE (OUTPATIENT)
Dept: FAMILY MEDICINE | Facility: CLINIC | Age: 46
End: 2021-08-25

## 2021-08-25 NOTE — TELEPHONE ENCOUNTER
[facility] phone call message- general phone call:    Reason for call: Pt is wondering if she can come in and see the lab to get her thyroid checked and then get a refill of her meds or if she needs to see the the doctor first.    Return call needed: Yes    OK to leave a message on voice mail? Yes    Primary language: English      needed? No    Call taken on August 25, 2021 at 12:52 PM by Majo Alfaro

## 2021-08-25 NOTE — TELEPHONE ENCOUNTER
Requested patient schedule an appointment as she has not been seen since 9/4/20. She is agreeable. Scheduled per request. ./LR

## 2021-08-31 ENCOUNTER — OFFICE VISIT (OUTPATIENT)
Dept: FAMILY MEDICINE | Facility: CLINIC | Age: 46
End: 2021-08-31
Payer: COMMERCIAL

## 2021-08-31 VITALS
TEMPERATURE: 98.3 F | DIASTOLIC BLOOD PRESSURE: 87 MMHG | RESPIRATION RATE: 20 BRPM | OXYGEN SATURATION: 100 % | HEART RATE: 79 BPM | SYSTOLIC BLOOD PRESSURE: 122 MMHG

## 2021-08-31 DIAGNOSIS — Z11.59 ENCOUNTER FOR HEPATITIS C SCREENING TEST FOR LOW RISK PATIENT: ICD-10-CM

## 2021-08-31 DIAGNOSIS — E03.9 HYPOTHYROIDISM, UNSPECIFIED TYPE: Primary | ICD-10-CM

## 2021-08-31 DIAGNOSIS — I49.9 IRREGULAR HEART BEATS: ICD-10-CM

## 2021-08-31 DIAGNOSIS — F50.89 PICA: ICD-10-CM

## 2021-08-31 LAB
ERYTHROCYTE [DISTWIDTH] IN BLOOD BY AUTOMATED COUNT: 19.2 % (ref 10–15)
HCT VFR BLD AUTO: 31.2 % (ref 35–47)
HGB BLD-MCNC: 9.3 G/DL (ref 11.7–15.7)
MCH RBC QN AUTO: 19.7 PG (ref 26.5–33)
MCHC RBC AUTO-ENTMCNC: 29.8 G/DL (ref 31.5–36.5)
MCV RBC AUTO: 66 FL (ref 78–100)
PLATELET # BLD AUTO: 475 10E3/UL (ref 150–450)
RBC # BLD AUTO: 4.73 10E6/UL (ref 3.8–5.2)
TSH SERPL DL<=0.005 MIU/L-ACNC: 1.46 UIU/ML (ref 0.3–5)
WBC # BLD AUTO: 7 10E3/UL (ref 4–11)

## 2021-08-31 PROCEDURE — 99214 OFFICE O/P EST MOD 30 MIN: CPT | Mod: 25 | Performed by: STUDENT IN AN ORGANIZED HEALTH CARE EDUCATION/TRAINING PROGRAM

## 2021-08-31 PROCEDURE — 90471 IMMUNIZATION ADMIN: CPT | Performed by: STUDENT IN AN ORGANIZED HEALTH CARE EDUCATION/TRAINING PROGRAM

## 2021-08-31 PROCEDURE — 93000 ELECTROCARDIOGRAM COMPLETE: CPT | Mod: GC | Performed by: STUDENT IN AN ORGANIZED HEALTH CARE EDUCATION/TRAINING PROGRAM

## 2021-08-31 PROCEDURE — 90715 TDAP VACCINE 7 YRS/> IM: CPT | Performed by: STUDENT IN AN ORGANIZED HEALTH CARE EDUCATION/TRAINING PROGRAM

## 2021-08-31 PROCEDURE — 84443 ASSAY THYROID STIM HORMONE: CPT | Performed by: STUDENT IN AN ORGANIZED HEALTH CARE EDUCATION/TRAINING PROGRAM

## 2021-08-31 PROCEDURE — 86803 HEPATITIS C AB TEST: CPT | Performed by: STUDENT IN AN ORGANIZED HEALTH CARE EDUCATION/TRAINING PROGRAM

## 2021-08-31 PROCEDURE — 85027 COMPLETE CBC AUTOMATED: CPT | Performed by: STUDENT IN AN ORGANIZED HEALTH CARE EDUCATION/TRAINING PROGRAM

## 2021-08-31 PROCEDURE — 36415 COLL VENOUS BLD VENIPUNCTURE: CPT | Performed by: STUDENT IN AN ORGANIZED HEALTH CARE EDUCATION/TRAINING PROGRAM

## 2021-08-31 NOTE — PROGRESS NOTES
Preceptor Attestation:    I discussed the patient with the resident and evaluated the patient in person. I personally viewed the EKG and agree with the interpretation documented by the resident. I have verified the content of the note, which accurately reflects my assessment of the patient and the plan of care.   Supervising Physician:  Seun Levine MD.

## 2021-08-31 NOTE — PROGRESS NOTES
There are no exam notes on file for this visit.    Assessment & Plan      1. Hypothyroidism, unspecified type  Patient would like TSH checked today as it has been 1 year since check. She has chronic fatigue, otherwise asymptomatic.   - TSH with free T4 reflex    2. Irregular heart beats  Irregularly irregular rate and rhythm noted on exam today, denies history of arrhythmia, admits to consuming monster drinks in the past 1 hour. EKG completed and showed NSR, no heart blocks. No further workup indicated at this time.   - EKG 12-lead, tracing only    3. Encounter for hepatitis C screening test for low risk patient  Agrees to screening today.   - Hepatitis C antibody    4. Pica  Patient has been craving ice, reports history of iron deficiency anemia in the past. She is s/p gastric bypass surgery. She also has chronic fatigue.   - CBC with platelets      Preventative Health: Due for CPE and mammogram, patient will call to schedule on her own time per her request. Got Tdap today.     Review of prior external note(s) from - TSH from 1 year prior  40 minutes spent on the date of the encounter doing chart review, history and exam, documentation and further activities per the note    No follow-ups on file.    Benita Behm, MD PGY2  [facility]  08/31/21    I precepted today with Dr. Levine.    Subjective   Tiffanie Rosas is a 46 year old who presents for the following health issues: TSH check    HPI    Patient presents to have her TSH checked as it has not been checked in ~1 year. Also asks to have hemoglobin checked as she has been craving ice. Reports chronic low energy, no swelling, no constipation, no palpitations, no temp intolerances or swelling. Did just drink 1+ monster drinks 1 hour ago.     Review of Systems  Constitutional, HEENT, cardiovascular, pulmonary, GI, , musculoskeletal, neuro, skin, endocrine and psych systems are negative, except as otherwise noted.    Objective   There were no  vitals taken for this visit.  Physical Exam    Constitutional: Awake, alert, cooperative, no apparent distress, and appears stated age.  Eyes: Lids and lashes normal, pupils equal, round and reactive to light, extra ocular muscles intact, sclera clear, conjunctiva normal.  ENT: Normocephalic, without obvious abnormality, atraumatic, external ears without lesions.  Neck: Supple, symmetrical, trachea midline, skin normal.  Lungs: No increased work of breathing, good air exchange, clear to auscultation bilaterally, no crackles or wheezing.  Cardiovascular: Irregularly irregular rate and rhythm, normal S1 and S2, no S3 or S4, and no murmur noted.  Abdomen: Normal bowel sounds, soft, non-distended, non-tender, no masses palpated, no hepatosplenomegally.  Musculoskeletal: Full range of motion noted. Tone is normal.  Neurologic: Awake, alert, oriented to name, place and time.  Cranial nerves II-XII are grossly intact. Gait is normal.  Neuropsychiatric: Normal affect, mood, orientation, memory and insight.  Skin: No visible rashes, erythema, pallor, petechia or purpura.    ----- Service Performed and Documented by Resident or Fellow ------

## 2021-09-01 DIAGNOSIS — D50.8 OTHER IRON DEFICIENCY ANEMIA: Primary | ICD-10-CM

## 2021-09-01 DIAGNOSIS — E03.8 OTHER SPECIFIED HYPOTHYROIDISM: ICD-10-CM

## 2021-09-01 LAB — HCV AB SERPL QL IA: NEGATIVE

## 2021-09-01 RX ORDER — FERROUS SULFATE 325(65) MG
325 TABLET ORAL
Qty: 90 TABLET | Refills: 3 | Status: SHIPPED | OUTPATIENT
Start: 2021-09-01 | End: 2022-08-11

## 2021-09-01 RX ORDER — LEVOTHYROXINE SODIUM 200 UG/1
200 TABLET ORAL DAILY
Qty: 90 TABLET | Refills: 3 | Status: SHIPPED | OUTPATIENT
Start: 2021-09-01 | End: 2022-08-11

## 2021-09-19 ENCOUNTER — HEALTH MAINTENANCE LETTER (OUTPATIENT)
Age: 46
End: 2021-09-19

## 2022-03-05 ENCOUNTER — HEALTH MAINTENANCE LETTER (OUTPATIENT)
Age: 47
End: 2022-03-05

## 2022-08-11 ENCOUNTER — OFFICE VISIT (OUTPATIENT)
Dept: FAMILY MEDICINE | Facility: CLINIC | Age: 47
End: 2022-08-11
Payer: COMMERCIAL

## 2022-08-11 VITALS
RESPIRATION RATE: 20 BRPM | SYSTOLIC BLOOD PRESSURE: 116 MMHG | DIASTOLIC BLOOD PRESSURE: 79 MMHG | TEMPERATURE: 98.3 F | OXYGEN SATURATION: 98 % | HEART RATE: 75 BPM

## 2022-08-11 DIAGNOSIS — E03.8 OTHER SPECIFIED HYPOTHYROIDISM: Primary | ICD-10-CM

## 2022-08-11 LAB
T4 FREE SERPL-MCNC: 1.48 NG/DL (ref 0.9–1.7)
TSH SERPL DL<=0.005 MIU/L-ACNC: 0.29 UIU/ML (ref 0.3–4.2)

## 2022-08-11 PROCEDURE — 84439 ASSAY OF FREE THYROXINE: CPT

## 2022-08-11 PROCEDURE — 36415 COLL VENOUS BLD VENIPUNCTURE: CPT

## 2022-08-11 PROCEDURE — 84443 ASSAY THYROID STIM HORMONE: CPT

## 2022-08-11 PROCEDURE — 99213 OFFICE O/P EST LOW 20 MIN: CPT | Mod: GC

## 2022-08-11 RX ORDER — LEVOTHYROXINE SODIUM 200 UG/1
200 TABLET ORAL DAILY
Qty: 90 TABLET | Refills: 3 | Status: SHIPPED | OUTPATIENT
Start: 2022-08-11 | End: 2023-08-23

## 2022-08-11 ASSESSMENT — ENCOUNTER SYMPTOMS
CARDIOVASCULAR NEGATIVE: 1
NEUROLOGICAL NEGATIVE: 1
GASTROINTESTINAL NEGATIVE: 1
CONSTITUTIONAL NEGATIVE: 1
PSYCHIATRIC NEGATIVE: 1
MUSCULOSKELETAL NEGATIVE: 1
RESPIRATORY NEGATIVE: 1
EYES NEGATIVE: 1

## 2022-08-11 NOTE — PROGRESS NOTES
"  Assessment & Plan     Other specified hypothyroidism  Patient states she has been treating hypothyroidism since she hit puberty. Has been taking 200 mcg levothyroxine once daily \"for years\".  - levothyroxine (EUTHYROX) 200 MCG tablet once daily Dispense: 90 tablet; Refill: 3  - TSH with free T4 reflex     Return if symptoms worsen or fail to improve.    Umair Quinn [facility] ARIELLE Moreno is a 47 year old, presenting for the following health issues:  Thyroid Problem (Need to get thy level to check thyroid)      Patient states she is here for her yearly thyroid check. Has not been experiencing any symptoms related to hypothyroidism. Patient's last TSH was within normal limits. Has been taking levothyroxine daily \"since puberty\". Currently at a 200 mcg dose.   Patient also has a history of gastric bypass surgery in 2003 and states she is still taking her vitamins \"when she remembers\". Is no longer taking an iron supplement and reports she has increased her intake of green, leafy vegetables.       Review of Systems   Constitutional: Negative.    HENT: Negative.    Eyes: Negative.    Respiratory: Negative.    Cardiovascular: Negative.    Gastrointestinal: Negative.    Musculoskeletal: Negative.    Neurological: Negative.    Psychiatric/Behavioral: Negative.           Objective    /79   Pulse 75   Temp 98.3  F (36.8  C) (Oral)   Resp 20   SpO2 98%   There is no height or weight on file to calculate BMI.  Physical Exam  Constitutional:       Appearance: Normal appearance. She is obese.   HENT:      Head: Normocephalic and atraumatic.      Right Ear: External ear normal.      Left Ear: External ear normal.      Nose: Nose normal.   Eyes:      Extraocular Movements: Extraocular movements intact.      Conjunctiva/sclera: Conjunctivae normal.      Pupils: Pupils are equal, round, and reactive to light.   Cardiovascular:      Rate and Rhythm: Normal rate and regular rhythm. "      Pulses: Normal pulses.      Heart sounds: Normal heart sounds.   Pulmonary:      Effort: Pulmonary effort is normal.      Breath sounds: Normal breath sounds.   Abdominal:      Palpations: Abdomen is soft.   Musculoskeletal:         General: Normal range of motion.      Cervical back: Normal range of motion and neck supple.   Skin:     General: Skin is warm and dry.   Neurological:      General: No focal deficit present.      Mental Status: She is alert and oriented to person, place, and time.   Psychiatric:         Mood and Affect: Mood normal.         Behavior: Behavior normal.         Thought Content: Thought content normal.         Judgment: Judgment normal.                  .  ..

## 2022-08-12 ENCOUNTER — TELEPHONE (OUTPATIENT)
Dept: FAMILY MEDICINE | Facility: CLINIC | Age: 47
End: 2022-08-12

## 2022-08-12 NOTE — TELEPHONE ENCOUNTER
Patient was called and notified of her low TSH result. Recommended that patient take her 200 mcg levothyroxine 6 days per week instead of her current 7 days/week regimen. Patient agreed and will implement the changes.    Umair Quinn, DO

## 2022-08-23 NOTE — PROGRESS NOTES
Preceptor Attestation:    I discussed the patient with the resident and evaluated the patient in person. I have verified the content of the note, which accurately reflects my assessment of the patient and the plan of care.   Supervising Physician:  Seun Levine MD.

## 2022-11-20 ENCOUNTER — HEALTH MAINTENANCE LETTER (OUTPATIENT)
Age: 47
End: 2022-11-20

## 2023-04-15 ENCOUNTER — HEALTH MAINTENANCE LETTER (OUTPATIENT)
Age: 48
End: 2023-04-15

## 2023-08-22 ENCOUNTER — OFFICE VISIT (OUTPATIENT)
Dept: FAMILY MEDICINE | Facility: CLINIC | Age: 48
End: 2023-08-22
Payer: COMMERCIAL

## 2023-08-22 VITALS
SYSTOLIC BLOOD PRESSURE: 104 MMHG | HEART RATE: 78 BPM | OXYGEN SATURATION: 99 % | BODY MASS INDEX: 19.91 KG/M2 | WEIGHT: 117.6 LBS | DIASTOLIC BLOOD PRESSURE: 74 MMHG

## 2023-08-22 DIAGNOSIS — Z98.84 HX OF GASTRIC BYPASS: Primary | ICD-10-CM

## 2023-08-22 DIAGNOSIS — E03.8 OTHER SPECIFIED HYPOTHYROIDISM: ICD-10-CM

## 2023-08-22 DIAGNOSIS — R13.10 SWALLOWING DYSFUNCTION: ICD-10-CM

## 2023-08-22 LAB
ERYTHROCYTE [DISTWIDTH] IN BLOOD BY AUTOMATED COUNT: 22.1 % (ref 10–15)
FERRITIN SERPL-MCNC: 4 NG/ML (ref 6–175)
FOLATE SERPL-MCNC: 6.2 NG/ML (ref 4.6–34.8)
HCT VFR BLD AUTO: 29.7 % (ref 35–47)
HGB BLD-MCNC: 8 G/DL (ref 11.7–15.7)
MCH RBC QN AUTO: 15.4 PG (ref 26.5–33)
MCHC RBC AUTO-ENTMCNC: 26.9 G/DL (ref 31.5–36.5)
MCV RBC AUTO: 57 FL (ref 78–100)
PLATELET # BLD AUTO: 751 10E3/UL (ref 150–450)
RBC # BLD AUTO: 5.18 10E6/UL (ref 3.8–5.2)
TSH SERPL DL<=0.005 MIU/L-ACNC: 3.21 UIU/ML (ref 0.3–4.2)
VIT B12 SERPL-MCNC: 555 PG/ML (ref 232–1245)
WBC # BLD AUTO: 6.3 10E3/UL (ref 4–11)

## 2023-08-22 PROCEDURE — 36415 COLL VENOUS BLD VENIPUNCTURE: CPT

## 2023-08-22 PROCEDURE — 82728 ASSAY OF FERRITIN: CPT

## 2023-08-22 PROCEDURE — 82306 VITAMIN D 25 HYDROXY: CPT

## 2023-08-22 PROCEDURE — 85027 COMPLETE CBC AUTOMATED: CPT

## 2023-08-22 PROCEDURE — 82746 ASSAY OF FOLIC ACID SERUM: CPT

## 2023-08-22 PROCEDURE — 99214 OFFICE O/P EST MOD 30 MIN: CPT | Mod: GC

## 2023-08-22 PROCEDURE — 82607 VITAMIN B-12: CPT

## 2023-08-22 PROCEDURE — 84443 ASSAY THYROID STIM HORMONE: CPT

## 2023-08-22 RX ORDER — LEVOTHYROXINE SODIUM 200 UG/1
200 TABLET ORAL DAILY
Qty: 90 TABLET | Refills: 3 | Status: CANCELLED | OUTPATIENT
Start: 2023-08-22

## 2023-08-22 NOTE — PROGRESS NOTES
Preceptor Attestation:    I discussed the patient with the resident and evaluated the patient in person. I have verified the content of the note, which accurately reflects my assessment of the patient and the plan of care.   Supervising Physician:  Seng Manzano MD.

## 2023-08-22 NOTE — PATIENT INSTRUCTIONS
If the trouble swallowing continues, you should come back so we can re-evaluate and likely you will need a test called a barium swallow.     We will let you know the results of the tests. Once the Thyroid level comes back, I well send in the appropriate dose of thyroid medication

## 2023-08-22 NOTE — PROGRESS NOTES
Assessment & Plan     Other specified hypothyroidism  Here for yearly thyroid fx test. She is on Synthroid 200 mcg currently. Approx 50 lb weight loss over one year. Increased exercise and healthy eating. Reports no other hypo- or hyper- thyroid symptoms. Will send in appropriate thyroid dose after lab results.   - TSH with free T4 reflex    Hx of gastric bypass  Will check vitamin and iron levels as below given hx of gastric bypass.   - Folate  - Vitamin B12  - Ferritin  - Vitamin D deficiency screening  - CBC with platelets    Swallowing dysfunction  Reports a couple episodes of globus sensation in throat when she takes large bites of food. Not painful. Thyroid normal to palpation. Discussed if this continues, she should return for further workup.   -consider barium swallow if continues         Return in about 3 months (around 11/22/2023) for Routine preventive.    Gwendolyn Beebe MD PGY3  [facility] Medicine Residency  08/22/23    I precepted today with Dr. Manzano.      Edgardo Moreno is a 48 year old, presenting for the following health issues:  Medication Refill and Nicotine Dependence      HPI   235 to 177 lb weight loss since last September  More active job and has made dietary changes    Would like to get vitamins checked for hx of gastric bypass and with her weight loss  She needs her thyroid levels checked. She is currently on 200 mcg. Other than weight loss he has no sx of hyper or hypo thyroidism. She tolerates the medication.     Discussed that she is due for preventive visit. Can get mammogram and colonoscopy. She will think about this, does not want it ordered at this time.     Review of Systems   Constitutional, HEENT, cardiovascular, pulmonary, gi and gu systems are negative, except as otherwise noted.      Objective    /74 (BP Location: Left arm, Patient Position: Sitting, Cuff Size: Adult Large)   Pulse 78   Wt 53.3 kg (117 lb 9.6 oz)   SpO2 99%   Breastfeeding No   BMI  19.91 kg/m    Body mass index is 19.91 kg/m .  Physical Exam   GENERAL: healthy, alert and no distress  NECK: no adenopathy, no asymmetry, masses, or scars and thyroid normal to palpation  RESP: lungs clear to auscultation - no rales, rhonchi or wheezes  CV: regular rate and rhythm, normal S1 S2, no S3 or S4, no murmur  MS: no gross musculoskeletal defects noted  PSYCH: mentation appears normal, affect normal/bright

## 2023-08-23 DIAGNOSIS — E03.8 OTHER SPECIFIED HYPOTHYROIDISM: ICD-10-CM

## 2023-08-23 DIAGNOSIS — Z98.84 S/P GASTRIC BYPASS: Primary | ICD-10-CM

## 2023-08-23 DIAGNOSIS — D50.8 OTHER IRON DEFICIENCY ANEMIA: ICD-10-CM

## 2023-08-23 DIAGNOSIS — R63.4 WEIGHT LOSS: ICD-10-CM

## 2023-08-23 DIAGNOSIS — D50.9 IRON DEFICIENCY ANEMIA, UNSPECIFIED IRON DEFICIENCY ANEMIA TYPE: ICD-10-CM

## 2023-08-23 DIAGNOSIS — R79.89 INCREASED PLATELET COUNT: Primary | ICD-10-CM

## 2023-08-23 LAB — DEPRECATED CALCIDIOL+CALCIFEROL SERPL-MC: 25 UG/L (ref 20–75)

## 2023-08-23 RX ORDER — EPINEPHRINE 1 MG/ML
0.3 INJECTION, SOLUTION, CONCENTRATE INTRAVENOUS EVERY 5 MIN PRN
Status: CANCELLED | OUTPATIENT
Start: 2023-08-30

## 2023-08-23 RX ORDER — MEPERIDINE HYDROCHLORIDE 25 MG/ML
25 INJECTION INTRAMUSCULAR; INTRAVENOUS; SUBCUTANEOUS EVERY 30 MIN PRN
Status: CANCELLED | OUTPATIENT
Start: 2023-08-30

## 2023-08-23 RX ORDER — HEPARIN SODIUM (PORCINE) LOCK FLUSH IV SOLN 100 UNIT/ML 100 UNIT/ML
5 SOLUTION INTRAVENOUS
Status: CANCELLED | OUTPATIENT
Start: 2023-08-30

## 2023-08-23 RX ORDER — HEPARIN SODIUM,PORCINE 10 UNIT/ML
5-20 VIAL (ML) INTRAVENOUS DAILY PRN
Status: CANCELLED | OUTPATIENT
Start: 2023-08-30

## 2023-08-23 RX ORDER — DIPHENHYDRAMINE HYDROCHLORIDE 50 MG/ML
50 INJECTION INTRAMUSCULAR; INTRAVENOUS
Status: CANCELLED
Start: 2023-08-30

## 2023-08-23 RX ORDER — ALBUTEROL SULFATE 0.83 MG/ML
2.5 SOLUTION RESPIRATORY (INHALATION)
Status: CANCELLED | OUTPATIENT
Start: 2023-08-30

## 2023-08-23 RX ORDER — METHYLPREDNISOLONE SODIUM SUCCINATE 125 MG/2ML
125 INJECTION, POWDER, LYOPHILIZED, FOR SOLUTION INTRAMUSCULAR; INTRAVENOUS
Status: CANCELLED
Start: 2023-08-30

## 2023-08-23 RX ORDER — LEVOTHYROXINE SODIUM 200 UG/1
200 TABLET ORAL DAILY
Qty: 90 TABLET | Refills: 3 | Status: SHIPPED | OUTPATIENT
Start: 2023-08-23

## 2023-08-23 RX ORDER — ALBUTEROL SULFATE 90 UG/1
1-2 AEROSOL, METERED RESPIRATORY (INHALATION)
Status: CANCELLED
Start: 2023-08-30

## 2023-08-23 NOTE — RESULT ENCOUNTER NOTE
Called patient to discuss results over the phone. Thyroid levels normal - sent in Synthroid replacement. Low hgb with low ferritin - will order IV iron infusions x3 with hx of gastric bypass. Hx of gastric bypass which may be cause of iron deficiency however she has had 50 lb weight loss over one year therefore also recommended EGD and colonoscopy though patient declined at this time. Platelets also high, may be reactive due to anemia though would recommend peripheral smear and hematology referral given weight loss which have been ordered. All questions answered. Patient has no other concerns. Plans to come to lab only appt for peripheral smear.     Gwendolyn Beebe MD PGY3  [facility] Medicine Residency  08/23/23    Discussed with Dr. Rojas.

## 2023-08-24 ENCOUNTER — PATIENT OUTREACH (OUTPATIENT)
Dept: ONCOLOGY | Facility: CLINIC | Age: 48
End: 2023-08-24
Payer: COMMERCIAL

## 2023-08-24 ENCOUNTER — TELEPHONE (OUTPATIENT)
Dept: FAMILY MEDICINE | Facility: CLINIC | Age: 48
End: 2023-08-24
Payer: COMMERCIAL

## 2023-08-24 ENCOUNTER — MYC MEDICAL ADVICE (OUTPATIENT)
Dept: ONCOLOGY | Facility: CLINIC | Age: 48
End: 2023-08-24
Payer: COMMERCIAL

## 2023-08-24 NOTE — PROGRESS NOTES
Hematology referral reviewed for [facility], see below.    Referral reason: Anemia, thrombocythemia    Current abnormal labs: Available in Chart Review    Outreach: Afua sent to patient    Plan: Triage instructions updated and sent to NPS for completion.

## 2023-08-24 NOTE — TELEPHONE ENCOUNTER
[facility] phone call message- general phone call:    Reason for call:     Will be having several appt for infusion, trying to apply for fmla and more other reason for patient's call today. Patient would like Dr. Beebe to return her phone call to discuss about her situation.       Return call needed: Yes    OK to leave a message on voice mail? Yes    Primary language: English      needed? No    Call taken on August 24, 2023 at 2:49 PM by Luis Hardin

## 2023-08-25 NOTE — TELEPHONE ENCOUNTER
Message  Received: Today  Taj Beebe MD Glumac, Nicole A, RN    Attempted to call patient, no answer. Will try again later today.

## 2023-08-26 NOTE — TELEPHONE ENCOUNTER
Called patient. She reports she will be filing [place] paperwork because of multiple appointments coming up including iron infusions and visit with hematology. She needs to be able to have intermittent leave for these appointments. She works 40 hour weeks (5 days/week). She plans to come to the clinic to get the additional blood tests drawn. All questions answered.     Gwendolyn Beebe MD PGY3  [facility] Residency  08/25/23

## 2023-08-29 ENCOUNTER — TELEPHONE (OUTPATIENT)
Dept: FAMILY MEDICINE | Facility: CLINIC | Age: 48
End: 2023-08-29

## 2023-08-29 ENCOUNTER — LAB (OUTPATIENT)
Dept: LAB | Facility: CLINIC | Age: 48
End: 2023-08-29
Payer: COMMERCIAL

## 2023-08-29 ENCOUNTER — HOSPITAL ENCOUNTER (EMERGENCY)
Facility: CLINIC | Age: 48
Discharge: HOME | End: 2023-08-30
Attending: STUDENT IN AN ORGANIZED HEALTH CARE EDUCATION/TRAINING PROGRAM | Admitting: STUDENT IN AN ORGANIZED HEALTH CARE EDUCATION/TRAINING PROGRAM
Payer: COMMERCIAL

## 2023-08-29 DIAGNOSIS — R79.89 INCREASED PLATELET COUNT: ICD-10-CM

## 2023-08-29 DIAGNOSIS — D50.9 IRON DEFICIENCY ANEMIA, UNSPECIFIED IRON DEFICIENCY ANEMIA TYPE: ICD-10-CM

## 2023-08-29 LAB
ABO/RH(D): NORMAL
ALBUMIN SERPL-MCNC: 3.7 G/DL (ref 3.5–5)
ALP SERPL-CCNC: 132 U/L (ref 45–120)
ALT SERPL W P-5'-P-CCNC: 17 U/L (ref 0–45)
ANION GAP SERPL CALCULATED.3IONS-SCNC: 11 MMOL/L (ref 5–18)
ANTIBODY SCREEN: NEGATIVE
APTT PPP: 26 SECONDS (ref 22–38)
APTT PPP: 27 SECONDS (ref 22–38)
AST SERPL W P-5'-P-CCNC: 28 U/L (ref 0–40)
BASOPHILS # BLD AUTO: 0 10E3/UL (ref 0–0.2)
BASOPHILS # BLD AUTO: 0.1 10E3/UL (ref 0–0.2)
BASOPHILS NFR BLD AUTO: 1 %
BASOPHILS NFR BLD AUTO: 1 %
BILIRUB DIRECT SERPL-MCNC: 0.1 MG/DL
BILIRUB SERPL-MCNC: 0.4 MG/DL (ref 0–1)
BLD PROD TYP BPU: NORMAL
BLOOD COMPONENT TYPE: NORMAL
BUN SERPL-MCNC: 13 MG/DL (ref 8–22)
CALCIUM SERPL-MCNC: 9.1 MG/DL (ref 8.5–10.5)
CHLORIDE BLD-SCNC: 106 MMOL/L (ref 98–107)
CO2 SERPL-SCNC: 24 MMOL/L (ref 22–31)
CODING SYSTEM: NORMAL
CREAT SERPL-MCNC: 0.77 MG/DL (ref 0.6–1.1)
CROSSMATCH: NORMAL
EOSINOPHIL # BLD AUTO: 0.1 10E3/UL (ref 0–0.7)
EOSINOPHIL # BLD AUTO: 0.1 10E3/UL (ref 0–0.7)
EOSINOPHIL NFR BLD AUTO: 1 %
EOSINOPHIL NFR BLD AUTO: 2 %
ERYTHROCYTE [DISTWIDTH] IN BLOOD BY AUTOMATED COUNT: 20.8 % (ref 10–15)
ERYTHROCYTE [DISTWIDTH] IN BLOOD BY AUTOMATED COUNT: 21.8 % (ref 10–15)
GFR SERPL CREATININE-BSD FRML MDRD: >90 ML/MIN/1.73M2
GLUCOSE BLD-MCNC: 91 MG/DL (ref 70–125)
HCT VFR BLD AUTO: 23.2 % (ref 35–47)
HCT VFR BLD AUTO: 23.6 % (ref 35–47)
HGB BLD-MCNC: 6.4 G/DL (ref 11.7–15.7)
HGB BLD-MCNC: 6.4 G/DL (ref 11.7–15.7)
HOLD SPECIMEN: NORMAL
IMM GRANULOCYTES # BLD: 0 10E3/UL
IMM GRANULOCYTES # BLD: 0 10E3/UL
IMM GRANULOCYTES NFR BLD: 0 %
IMM GRANULOCYTES NFR BLD: 0 %
INR PPP: 0.91 (ref 0.85–1.15)
INR PPP: 0.99 (ref 0.85–1.15)
ISSUE DATE AND TIME: NORMAL
LYMPHOCYTES # BLD AUTO: 1.5 10E3/UL (ref 0.8–5.3)
LYMPHOCYTES # BLD AUTO: 1.6 10E3/UL (ref 0.8–5.3)
LYMPHOCYTES NFR BLD AUTO: 25 %
LYMPHOCYTES NFR BLD AUTO: 27 %
MCH RBC QN AUTO: 15.4 PG (ref 26.5–33)
MCH RBC QN AUTO: 16 PG (ref 26.5–33)
MCHC RBC AUTO-ENTMCNC: 27.1 G/DL (ref 31.5–36.5)
MCHC RBC AUTO-ENTMCNC: 27.6 G/DL (ref 31.5–36.5)
MCV RBC AUTO: 57 FL (ref 78–100)
MCV RBC AUTO: 58 FL (ref 78–100)
MONOCYTES # BLD AUTO: 0.5 10E3/UL (ref 0–1.3)
MONOCYTES # BLD AUTO: 0.5 10E3/UL (ref 0–1.3)
MONOCYTES NFR BLD AUTO: 8 %
MONOCYTES NFR BLD AUTO: 8 %
NEUTROPHILS # BLD AUTO: 3.5 10E3/UL (ref 1.6–8.3)
NEUTROPHILS # BLD AUTO: 4 10E3/UL (ref 1.6–8.3)
NEUTROPHILS NFR BLD AUTO: 63 %
NEUTROPHILS NFR BLD AUTO: 64 %
NRBC # BLD AUTO: 0 10E3/UL
NRBC # BLD AUTO: 0 10E3/UL
NRBC BLD AUTO-RTO: 0 /100
NRBC BLD AUTO-RTO: 0 /100
PLATELET # BLD AUTO: 651 10E3/UL (ref 150–450)
PLATELET # BLD AUTO: 660 10E3/UL (ref 150–450)
POTASSIUM BLD-SCNC: 3.7 MMOL/L (ref 3.5–5)
PROT SERPL-MCNC: 6.4 G/DL (ref 6–8)
RBC # BLD AUTO: 4.01 10E6/UL (ref 3.8–5.2)
RBC # BLD AUTO: 4.15 10E6/UL (ref 3.8–5.2)
RETICS # AUTO: 0.07 10E6/UL (ref 0.01–0.11)
RETICS/RBC NFR AUTO: 1.7 % (ref 0.8–2.7)
SODIUM SERPL-SCNC: 141 MMOL/L (ref 136–145)
SPECIMEN EXPIRATION DATE: NORMAL
UNIT ABO/RH: NORMAL
UNIT NUMBER: NORMAL
UNIT STATUS: NORMAL
UNIT TYPE ISBT: 9500
WBC # BLD AUTO: 5.6 10E3/UL (ref 4–11)
WBC # BLD AUTO: 6.3 10E3/UL (ref 4–11)

## 2023-08-29 PROCEDURE — 85730 THROMBOPLASTIN TIME PARTIAL: CPT | Performed by: EMERGENCY MEDICINE

## 2023-08-29 PROCEDURE — 36415 COLL VENOUS BLD VENIPUNCTURE: CPT | Performed by: EMERGENCY MEDICINE

## 2023-08-29 PROCEDURE — 36430 TRANSFUSION BLD/BLD COMPNT: CPT

## 2023-08-29 PROCEDURE — 85730 THROMBOPLASTIN TIME PARTIAL: CPT

## 2023-08-29 PROCEDURE — 85060 BLOOD SMEAR INTERPRETATION: CPT | Performed by: PATHOLOGY

## 2023-08-29 PROCEDURE — 86923 COMPATIBILITY TEST ELECTRIC: CPT | Performed by: STUDENT IN AN ORGANIZED HEALTH CARE EDUCATION/TRAINING PROGRAM

## 2023-08-29 PROCEDURE — 85045 AUTOMATED RETICULOCYTE COUNT: CPT

## 2023-08-29 PROCEDURE — P9016 RBC LEUKOCYTES REDUCED: HCPCS | Performed by: STUDENT IN AN ORGANIZED HEALTH CARE EDUCATION/TRAINING PROGRAM

## 2023-08-29 PROCEDURE — 85025 COMPLETE CBC W/AUTO DIFF WBC: CPT

## 2023-08-29 PROCEDURE — 83550 IRON BINDING TEST: CPT

## 2023-08-29 PROCEDURE — 36415 COLL VENOUS BLD VENIPUNCTURE: CPT

## 2023-08-29 PROCEDURE — 85610 PROTHROMBIN TIME: CPT

## 2023-08-29 PROCEDURE — 85610 PROTHROMBIN TIME: CPT | Performed by: EMERGENCY MEDICINE

## 2023-08-29 PROCEDURE — 86901 BLOOD TYPING SEROLOGIC RH(D): CPT | Performed by: EMERGENCY MEDICINE

## 2023-08-29 PROCEDURE — 80053 COMPREHEN METABOLIC PANEL: CPT | Performed by: EMERGENCY MEDICINE

## 2023-08-29 PROCEDURE — 99285 EMERGENCY DEPT VISIT HI MDM: CPT | Mod: 25

## 2023-08-29 PROCEDURE — 86850 RBC ANTIBODY SCREEN: CPT | Performed by: EMERGENCY MEDICINE

## 2023-08-29 PROCEDURE — 85025 COMPLETE CBC W/AUTO DIFF WBC: CPT | Performed by: EMERGENCY MEDICINE

## 2023-08-29 PROCEDURE — 82248 BILIRUBIN DIRECT: CPT | Performed by: EMERGENCY MEDICINE

## 2023-08-29 ASSESSMENT — ACTIVITIES OF DAILY LIVING (ADL): ADLS_ACUITY_SCORE: 35

## 2023-08-29 NOTE — ED NOTES
Expected Patient Referral to ED  5:45 PM    Referring Clinic/Provider:  [facility]    Reason for referral/Clinical facts:  Saw last week in clinic with Hgb of 8, previously 9.3 in 2021.  Today in clinic for peripheral smear and Hgb was 6.1 (lab not documented yet).  Needs transfusion and heme onc follow up.  Pt scheduled for iron infusion.  Resident service planning to get her closer follow up with heme onc and pt could likely be discharged after transfusion as she's not active bleeding, otherwise stable and has follow up.  Hx of gastric bypass.      Recommendations provided:  Send to ED for further evaluation    Caller was informed that this institution does possess the capabilities and/or resources to provide for patient and should be transferred to our facility.    Discussed that if direct admit is sought and any hurdles are encountered, this ED would be happy to see the patient and evaluate.    Informed caller that recommendations provided are recommendations based only on the facts provided and that they responsible to accept or reject the advice, or to seek a formal in person consultation as needed and that this ED will see/treat patient should they arrive.      Antonio Olivarez MD  [facility] EMERGENCY ROOM  [address] [address] [phone]       Antonio Olivarez MD  08/29/23 0927

## 2023-08-29 NOTE — TELEPHONE ENCOUNTER
Verbally notified by lab that hgb on labs today 6.1 - lab was sent out. Last hgb 8 on 8/22/23. She had a lab only appt today for a peripheral smear. She was referred to heme/onc at the last appt due to also having elevated platelets and 50 lb weight loss in one year.  She has iron infusions ordered for ferritin of 4 though this has not been started yet. Attempted to call patient x2, no answer. Left VM that I recommend she goes to the ED for blood transfusion. She may need to be admitted for further workup.     Gwendolyn Beebe MD PGY3  [facility] Medicine Residency  08/29/23    Discussed with Dr Rojas

## 2023-08-30 ENCOUNTER — MYC MEDICAL ADVICE (OUTPATIENT)
Dept: FAMILY MEDICINE | Facility: CLINIC | Age: 48
End: 2023-08-30
Payer: COMMERCIAL

## 2023-08-30 ENCOUNTER — TELEPHONE (OUTPATIENT)
Dept: FAMILY MEDICINE | Facility: CLINIC | Age: 48
End: 2023-08-30
Payer: COMMERCIAL

## 2023-08-30 ENCOUNTER — LAB (OUTPATIENT)
Dept: LAB | Facility: CLINIC | Age: 48
End: 2023-08-30
Payer: COMMERCIAL

## 2023-08-30 VITALS
SYSTOLIC BLOOD PRESSURE: 121 MMHG | HEART RATE: 67 BPM | WEIGHT: 177 LBS | BODY MASS INDEX: 30.22 KG/M2 | HEIGHT: 64 IN | DIASTOLIC BLOOD PRESSURE: 72 MMHG | OXYGEN SATURATION: 97 % | RESPIRATION RATE: 19 BRPM | TEMPERATURE: 97.4 F

## 2023-08-30 DIAGNOSIS — D50.9 IRON DEFICIENCY ANEMIA, UNSPECIFIED IRON DEFICIENCY ANEMIA TYPE: Primary | ICD-10-CM

## 2023-08-30 DIAGNOSIS — D50.9 IRON DEFICIENCY ANEMIA, UNSPECIFIED IRON DEFICIENCY ANEMIA TYPE: ICD-10-CM

## 2023-08-30 LAB
HGB BLD-MCNC: 8.1 G/DL (ref 11.7–15.7)
IRON BINDING CAPACITY (ROCHE): ABNORMAL
IRON SATN MFR SERPL: ABNORMAL %
IRON SERPL-MCNC: <5 UG/DL (ref 37–145)
PATH REPORT.COMMENTS IMP SPEC: NORMAL
PATH REPORT.COMMENTS IMP SPEC: NORMAL
PATH REPORT.FINAL DX SPEC: NORMAL
PATH REPORT.RELEVANT HX SPEC: NORMAL

## 2023-08-30 PROCEDURE — 36415 COLL VENOUS BLD VENIPUNCTURE: CPT

## 2023-08-30 PROCEDURE — 258N000003 HC RX IP 258 OP 636: Performed by: STUDENT IN AN ORGANIZED HEALTH CARE EDUCATION/TRAINING PROGRAM

## 2023-08-30 PROCEDURE — 85018 HEMOGLOBIN: CPT

## 2023-08-30 RX ADMIN — SODIUM CHLORIDE 100 ML: 9 INJECTION, SOLUTION INTRAVENOUS at 00:09

## 2023-08-30 NOTE — ED TRIAGE NOTES
Pt here because at clinic they found her Hgb to be 6.1. Pt has a hx of lower Hgb and is being set up for iron infusions. Pt denies any SOB, weakness, or blood in her stools.

## 2023-08-30 NOTE — TELEPHONE ENCOUNTER
Contacted heme/onc and spoke with Dr Hart. Thrombocytosis likely reactive 2/2 significant anemia. They will try to move up the date of her first IV iron transfusion. Recommended keeping the new consult as scheduled on 9/12. Called patient, left VM with this information (patient has given verbal consent to leave medical information in VM).     Gwendolyn Beebe MD PGY3  [facility] Medicine Residency  08/30/23

## 2023-08-30 NOTE — ED PROVIDER NOTES
EMERGENCY DEPARTMENT ENCOUNTER       ED Course & Medical Decision Making     Final Impression  48 year old female presents for evaluation of critical anemia.  Patient is being worked up in the [facility] for anemia, thought to be likely iron deficiency with a low hemoglobin and low ferritin, they had ordered IV iron infusions though these are not set to start until next week.  Patient denies any bleeding, no bloody stools, no vaginal bleeding.  In terms of symptoms patient states that she works in [employer], states that is frequently hot and sweaty at work, often feels lightheaded, though attributed her symptoms to her demanding work environment.  Has been chewing more ice lately.  Patient ambulatory, alert, though is slightly pale.  Patient is agreeable to PRBC transfusion, denies history of having transfusion in the past.  Patient hemodynamically stable, does seem reasonable to do 1 unit PRBC transfusion to help temporize patient until her iron infusion start next week, does appear to be microcytic, history of gastric bypass, would agree peripheral smear was done and is pending, has plans to follow-up with oncology.  Trend of hemoglobin shows that she was 9.3 in 2021, 12.1 in 2019.  We will plan for 1 unit PRBC transfusion in the ED discharge home if patient is feeling well, can continue with outpatient follow-up as regularly scheduled through her primary care clinic.    Patient denies any chest pain or significant shortness of breath.    Prior to making a final disposition on this patient the results of patient's tests and other diagnostic studies were discussed with the patient. All questions were answered. Patient expressed understanding of the plan and was amenable to it.    Medical Decision Making    History:  Supplemental history from: Daughter  External Record(s) reviewed as documented below;  8/22/2023 [facility] note, seen for routine health maintenance and found to be anemic with a  hemoglobin of 8.0, ferritin low at 4    Work Up:  Chart documentation includes differential considered and any EKGs or imaging independently interpreted by provider, where specified.    Complicating factors:  Care impacted by chronic illness: Hypothyroidism, history of gastric bypass  Care affected by social determinants of health: Access to Medical Care    Disposition considerations: Discharge. No recommendations on prescription strength medication(s). I considered admission, but discharged patient after significant clinical improvement.      Medications   0.9% sodium chloride BOLUS (has no administration in time range)       Final Impression     1. Iron deficiency anemia, unspecified iron deficiency anemia type        Critical Care     Performed by: Ed Joe MD  Authorized by: Ed Joe MD                   Total critical care time: 30 minutes  Critical care was necessary to treat or prevent imminent or life-threatening deterioration of the following conditions: Critical anemia requiring PRBC transfusion in the ED  Critical care was time spent personally by me on the following activities: development of treatment plan with patient or surrogate, discussions with consultants, examination of patient, evaluation of patient's response to treatment, obtaining history from patient or surrogate, ordering and performing treatments and interventions, ordering and review of laboratory studies, ordering and review of radiographic studies, re-evaluation of patient's condition and monitoring for potential decompensation.  Critical care time was exclusive of separately billable procedures and treating other patients.    Chief Complaint     Chief Complaint   Patient presents with    Abnormal Labs     Pt here because at clinic they found her Hgb to be 6.1. Pt has a hx of lower Hgb and is being set up for iron infusions. Pt denies any SOB, weakness, or blood in her stools.    HPI     Tiffanie Rosas is a 48  "year old female who presents for evaluation of anemia      Physical Exam     Ht 1.626 m (5' 4\")   Wt 80.3 kg (177 lb)   BMI 30.38 kg/m    Constitutional: Awake, alert, in no acute distress.  Head: Normocephalic, atraumatic.  ENT: Mucous membranes moist.  Eyes: Conjunctiva normal.  Respiratory: Respirations even, unlabored, in no acute respiratory distress.  Cardiovascular: Regular rate and rhythm. Good peripheral perfusion.  GI: Abdomen soft, non-tender.  Musculoskeletal: Moves all 4 extremities equally.  Integument: Warm, dry.  Moderate pallor  Neurologic: Alert & oriented x 3. Normal speech. Grossly normal motor and sensory function. No focal deficits noted.  Psychiatric: Normal mood    Labs & Imaging     Results for orders placed or performed during the hospital encounter of 08/29/23   Result Value Ref Range    INR 0.91 0.85 - 1.15   Partial thromboplastin time   Result Value Ref Range    aPTT 27 22 - 38 Seconds   Basic metabolic panel   Result Value Ref Range    Sodium 141 136 - 145 mmol/L    Potassium 3.7 3.5 - 5.0 mmol/L    Chloride 106 98 - 107 mmol/L    Carbon Dioxide (CO2) 24 22 - 31 mmol/L    Anion Gap 11 5 - 18 mmol/L    Urea Nitrogen 13 8 - 22 mg/dL    Creatinine 0.77 0.60 - 1.10 mg/dL    Calcium 9.1 8.5 - 10.5 mg/dL    Glucose 91 70 - 125 mg/dL    GFR Estimate >90 >60 mL/min/1.73m2   Hepatic function panel   Result Value Ref Range    Bilirubin Total 0.4 0.0 - 1.0 mg/dL    Bilirubin Direct 0.1 <=0.5 mg/dL    Protein Total 6.4 6.0 - 8.0 g/dL    Albumin 3.7 3.5 - 5.0 g/dL    Alkaline Phosphatase 132 (H) 45 - 120 U/L    AST 28 0 - 40 U/L    ALT 17 0 - 45 U/L   CBC with platelets and differential   Result Value Ref Range    WBC Count 6.3 4.0 - 11.0 10e3/uL    RBC Count 4.15 3.80 - 5.20 10e6/uL    Hemoglobin 6.4 (LL) 11.7 - 15.7 g/dL    Hematocrit 23.6 (L) 35.0 - 47.0 %    MCV 57 (L) 78 - 100 fL    MCH 15.4 (L) 26.5 - 33.0 pg    MCHC 27.1 (L) 31.5 - 36.5 g/dL    RDW 20.8 (H) 10.0 - 15.0 %    Platelet Count " 651 (H) 150 - 450 10e3/uL    % Neutrophils 64 %    % Lymphocytes 25 %    % Monocytes 8 %    % Eosinophils 2 %    % Basophils 1 %    % Immature Granulocytes 0 %    NRBCs per 100 WBC 0 <1 /100    Absolute Neutrophils 4.0 1.6 - 8.3 10e3/uL    Absolute Lymphocytes 1.6 0.8 - 5.3 10e3/uL    Absolute Monocytes 0.5 0.0 - 1.3 10e3/uL    Absolute Eosinophils 0.1 0.0 - 0.7 10e3/uL    Absolute Basophils 0.1 0.0 - 0.2 10e3/uL    Absolute Immature Granulocytes 0.0 <=0.4 10e3/uL    Absolute NRBCs 0.0 10e3/uL   Adult Type and Screen   Result Value Ref Range    ABO/RH(D) O NEG     Antibody Screen Negative Negative    SPECIMEN EXPIRATION DATE [id number]    Prepare red blood cells (unit)   Result Value Ref Range    Blood Component Type Red Blood Cells     Product Code [id number]     Unit Status Ready for issue     Unit Number [id number]     CROSSMATCH Compatible     CODING SYSTEM [id number]         Ed Joe MD  08/29/23 2158       Ed Joe MD  08/29/23 2158

## 2023-08-30 NOTE — TELEPHONE ENCOUNTER
Called patient to discuss MyChart message. Ordered lab only - hgb, she will come today to have this drawn. Working to move up hematology appointment.     Gwendolyn Beebe MD PGY3  [facility] Medicine Residency  08/30/23

## 2023-08-31 ENCOUNTER — INFUSION THERAPY VISIT (OUTPATIENT)
Dept: INFUSION THERAPY | Facility: HOSPITAL | Age: 48
End: 2023-08-31
Payer: COMMERCIAL

## 2023-08-31 VITALS
HEART RATE: 71 BPM | RESPIRATION RATE: 20 BRPM | SYSTOLIC BLOOD PRESSURE: 126 MMHG | DIASTOLIC BLOOD PRESSURE: 78 MMHG | OXYGEN SATURATION: 96 % | TEMPERATURE: 98.4 F

## 2023-08-31 DIAGNOSIS — D50.9 IRON DEFICIENCY ANEMIA, UNSPECIFIED IRON DEFICIENCY ANEMIA TYPE: ICD-10-CM

## 2023-08-31 DIAGNOSIS — Z98.84 S/P GASTRIC BYPASS: Primary | ICD-10-CM

## 2023-08-31 PROCEDURE — 250N000011 HC RX IP 250 OP 636: Mod: JZ

## 2023-08-31 PROCEDURE — 96365 THER/PROPH/DIAG IV INF INIT: CPT

## 2023-08-31 PROCEDURE — 999N000248 HC STATISTIC IV INSERT WITH US BY RN

## 2023-08-31 PROCEDURE — 96366 THER/PROPH/DIAG IV INF ADDON: CPT

## 2023-08-31 PROCEDURE — 258N000003 HC RX IP 258 OP 636

## 2023-08-31 RX ORDER — HEPARIN SODIUM (PORCINE) LOCK FLUSH IV SOLN 100 UNIT/ML 100 UNIT/ML
5 SOLUTION INTRAVENOUS
Status: CANCELLED | OUTPATIENT
Start: 2023-09-02

## 2023-08-31 RX ORDER — DIPHENHYDRAMINE HYDROCHLORIDE 50 MG/ML
50 INJECTION INTRAMUSCULAR; INTRAVENOUS
Status: DISCONTINUED | OUTPATIENT
Start: 2023-08-31 | End: 2023-08-31 | Stop reason: HOSPADM

## 2023-08-31 RX ORDER — HEPARIN SODIUM,PORCINE 10 UNIT/ML
5-20 VIAL (ML) INTRAVENOUS DAILY PRN
Status: CANCELLED | OUTPATIENT
Start: 2023-09-02

## 2023-08-31 RX ORDER — METHYLPREDNISOLONE SODIUM SUCCINATE 125 MG/2ML
125 INJECTION, POWDER, LYOPHILIZED, FOR SOLUTION INTRAMUSCULAR; INTRAVENOUS
Status: CANCELLED
Start: 2023-09-02

## 2023-08-31 RX ORDER — ALBUTEROL SULFATE 90 UG/1
1-2 AEROSOL, METERED RESPIRATORY (INHALATION)
Status: DISCONTINUED | OUTPATIENT
Start: 2023-08-31 | End: 2023-08-31 | Stop reason: HOSPADM

## 2023-08-31 RX ORDER — DIPHENHYDRAMINE HYDROCHLORIDE 50 MG/ML
50 INJECTION INTRAMUSCULAR; INTRAVENOUS
Status: CANCELLED
Start: 2023-09-02

## 2023-08-31 RX ORDER — ALBUTEROL SULFATE 0.83 MG/ML
2.5 SOLUTION RESPIRATORY (INHALATION)
Status: CANCELLED | OUTPATIENT
Start: 2023-09-02

## 2023-08-31 RX ORDER — EPINEPHRINE 1 MG/ML
0.3 INJECTION, SOLUTION INTRAMUSCULAR; SUBCUTANEOUS EVERY 5 MIN PRN
Status: DISCONTINUED | OUTPATIENT
Start: 2023-08-31 | End: 2023-08-31 | Stop reason: HOSPADM

## 2023-08-31 RX ORDER — EPINEPHRINE 1 MG/ML
0.3 INJECTION, SOLUTION INTRAMUSCULAR; SUBCUTANEOUS EVERY 5 MIN PRN
Status: CANCELLED | OUTPATIENT
Start: 2023-09-02

## 2023-08-31 RX ORDER — MEPERIDINE HYDROCHLORIDE 25 MG/ML
25 INJECTION INTRAMUSCULAR; INTRAVENOUS; SUBCUTANEOUS EVERY 30 MIN PRN
Status: CANCELLED | OUTPATIENT
Start: 2023-09-02

## 2023-08-31 RX ORDER — MEPERIDINE HYDROCHLORIDE 25 MG/ML
25 INJECTION INTRAMUSCULAR; INTRAVENOUS; SUBCUTANEOUS EVERY 30 MIN PRN
Status: DISCONTINUED | OUTPATIENT
Start: 2023-08-31 | End: 2023-08-31 | Stop reason: HOSPADM

## 2023-08-31 RX ORDER — ALBUTEROL SULFATE 0.83 MG/ML
2.5 SOLUTION RESPIRATORY (INHALATION)
Status: DISCONTINUED | OUTPATIENT
Start: 2023-08-31 | End: 2023-08-31 | Stop reason: HOSPADM

## 2023-08-31 RX ORDER — ALBUTEROL SULFATE 90 UG/1
1-2 AEROSOL, METERED RESPIRATORY (INHALATION)
Status: CANCELLED
Start: 2023-09-02

## 2023-08-31 RX ORDER — METHYLPREDNISOLONE SODIUM SUCCINATE 125 MG/2ML
125 INJECTION, POWDER, LYOPHILIZED, FOR SOLUTION INTRAMUSCULAR; INTRAVENOUS
Status: DISCONTINUED | OUTPATIENT
Start: 2023-08-31 | End: 2023-08-31 | Stop reason: HOSPADM

## 2023-08-31 RX ADMIN — SODIUM CHLORIDE 250 ML: 9 INJECTION, SOLUTION INTRAVENOUS at 14:30

## 2023-08-31 RX ADMIN — IRON SUCROSE 300 MG: 20 INJECTION, SOLUTION INTRAVENOUS at 14:30

## 2023-08-31 ASSESSMENT — PAIN SCALES - GENERAL: PAINLEVEL: NO PAIN (0)

## 2023-08-31 NOTE — PROGRESS NOTES
Infusion Nursing Note:  Tiffanie Rosas presents today for IV Venofer 1 of 3.    Patient seen by provider today: No   present during visit today: Not Applicable.    Note: Tiffanie arrived to infusion therapy for dose one of Venofer,  first medication teaching done and handout given,  pt expressed understanding of education,  PIV attempted x2 without success, PICC was called and started a 20 G PIV,  medications administered as ordered, followed with Normal saline flush during 30 minute observation period.  Pt vss and left with her mother to lobby. Next appointment is 9/6.      Intravenous Access:  Peripheral IV placed attempted to get IV x2, PICC team called.    Treatment Conditions:  Not Applicable.      Post Infusion Assessment:  Patient tolerated infusion without incident.  Blood return noted pre and post infusion.  No evidence of extravasations.  Access discontinued per protocol.       Discharge Plan:   Discharge instructions reviewed with: Patient.  Patient and/or family verbalized understanding of discharge instructions and all questions answered.  Patient discharged in stable condition accompanied by: self and mother.  Departure Mode: Ambulatory.      Annie Lee RN

## 2023-09-06 ENCOUNTER — INFUSION THERAPY VISIT (OUTPATIENT)
Dept: INFUSION THERAPY | Facility: HOSPITAL | Age: 48
End: 2023-09-06
Payer: COMMERCIAL

## 2023-09-06 VITALS
SYSTOLIC BLOOD PRESSURE: 124 MMHG | OXYGEN SATURATION: 100 % | DIASTOLIC BLOOD PRESSURE: 67 MMHG | TEMPERATURE: 98.6 F | RESPIRATION RATE: 16 BRPM | HEART RATE: 70 BPM

## 2023-09-06 DIAGNOSIS — D50.9 IRON DEFICIENCY ANEMIA, UNSPECIFIED IRON DEFICIENCY ANEMIA TYPE: Primary | ICD-10-CM

## 2023-09-06 DIAGNOSIS — Z98.84 S/P GASTRIC BYPASS: ICD-10-CM

## 2023-09-06 PROCEDURE — 96365 THER/PROPH/DIAG IV INF INIT: CPT

## 2023-09-06 PROCEDURE — 250N000011 HC RX IP 250 OP 636: Mod: JZ

## 2023-09-06 PROCEDURE — 96366 THER/PROPH/DIAG IV INF ADDON: CPT

## 2023-09-06 PROCEDURE — 258N000003 HC RX IP 258 OP 636

## 2023-09-06 RX ORDER — ALBUTEROL SULFATE 0.83 MG/ML
2.5 SOLUTION RESPIRATORY (INHALATION)
Status: DISCONTINUED | OUTPATIENT
Start: 2023-09-06 | End: 2023-09-06 | Stop reason: HOSPADM

## 2023-09-06 RX ORDER — EPINEPHRINE 1 MG/ML
0.3 INJECTION, SOLUTION INTRAMUSCULAR; SUBCUTANEOUS EVERY 5 MIN PRN
Status: DISCONTINUED | OUTPATIENT
Start: 2023-09-06 | End: 2023-09-06 | Stop reason: HOSPADM

## 2023-09-06 RX ORDER — DIPHENHYDRAMINE HYDROCHLORIDE 50 MG/ML
50 INJECTION INTRAMUSCULAR; INTRAVENOUS
Status: DISCONTINUED | OUTPATIENT
Start: 2023-09-06 | End: 2023-09-06 | Stop reason: HOSPADM

## 2023-09-06 RX ORDER — HEPARIN SODIUM,PORCINE 10 UNIT/ML
5-20 VIAL (ML) INTRAVENOUS DAILY PRN
Status: CANCELLED | OUTPATIENT
Start: 2023-09-08

## 2023-09-06 RX ORDER — EPINEPHRINE 1 MG/ML
0.3 INJECTION, SOLUTION INTRAMUSCULAR; SUBCUTANEOUS EVERY 5 MIN PRN
Status: CANCELLED | OUTPATIENT
Start: 2023-09-08

## 2023-09-06 RX ORDER — MEPERIDINE HYDROCHLORIDE 25 MG/ML
25 INJECTION INTRAMUSCULAR; INTRAVENOUS; SUBCUTANEOUS EVERY 30 MIN PRN
Status: DISCONTINUED | OUTPATIENT
Start: 2023-09-06 | End: 2023-09-06 | Stop reason: HOSPADM

## 2023-09-06 RX ORDER — HEPARIN SODIUM (PORCINE) LOCK FLUSH IV SOLN 100 UNIT/ML 100 UNIT/ML
5 SOLUTION INTRAVENOUS
Status: CANCELLED | OUTPATIENT
Start: 2023-09-08

## 2023-09-06 RX ORDER — ALBUTEROL SULFATE 0.83 MG/ML
2.5 SOLUTION RESPIRATORY (INHALATION)
Status: CANCELLED | OUTPATIENT
Start: 2023-09-08

## 2023-09-06 RX ORDER — DIPHENHYDRAMINE HYDROCHLORIDE 50 MG/ML
50 INJECTION INTRAMUSCULAR; INTRAVENOUS
Status: CANCELLED
Start: 2023-09-08

## 2023-09-06 RX ORDER — ALBUTEROL SULFATE 90 UG/1
1-2 AEROSOL, METERED RESPIRATORY (INHALATION)
Status: CANCELLED
Start: 2023-09-08

## 2023-09-06 RX ORDER — MEPERIDINE HYDROCHLORIDE 25 MG/ML
25 INJECTION INTRAMUSCULAR; INTRAVENOUS; SUBCUTANEOUS EVERY 30 MIN PRN
Status: CANCELLED | OUTPATIENT
Start: 2023-09-08

## 2023-09-06 RX ORDER — METHYLPREDNISOLONE SODIUM SUCCINATE 125 MG/2ML
125 INJECTION, POWDER, LYOPHILIZED, FOR SOLUTION INTRAMUSCULAR; INTRAVENOUS
Status: CANCELLED
Start: 2023-09-08

## 2023-09-06 RX ORDER — ALBUTEROL SULFATE 90 UG/1
1-2 AEROSOL, METERED RESPIRATORY (INHALATION)
Status: DISCONTINUED | OUTPATIENT
Start: 2023-09-06 | End: 2023-09-06 | Stop reason: HOSPADM

## 2023-09-06 RX ORDER — METHYLPREDNISOLONE SODIUM SUCCINATE 125 MG/2ML
125 INJECTION, POWDER, LYOPHILIZED, FOR SOLUTION INTRAMUSCULAR; INTRAVENOUS
Status: DISCONTINUED | OUTPATIENT
Start: 2023-09-06 | End: 2023-09-06 | Stop reason: HOSPADM

## 2023-09-06 RX ADMIN — IRON SUCROSE 300 MG: 20 INJECTION, SOLUTION INTRAVENOUS at 14:14

## 2023-09-06 RX ADMIN — SODIUM CHLORIDE 250 ML: 9 INJECTION, SOLUTION INTRAVENOUS at 14:14

## 2023-09-06 NOTE — TELEPHONE ENCOUNTER
RECORDS STATUS - ALL OTHER DIAGNOSIS      RECORDS RECEIVED FROM: [facility]   DATE RECEIVED: 9/6   NOTES STATUS DETAILS   OFFICE NOTE from referring provider Taj Martin MD in [facility]    DISCHARGE REPORT from the ER [facility] 8/29/23   MEDICATION LIST [facility] 8/28/23   LABS     ANYTHING RELATED TO DIAGNOSIS Epic 8/30/23

## 2023-09-06 NOTE — PROGRESS NOTES
Infusion Nursing Note:  Tiffanie Rosas presents today for Iron Sucrose #2/3.    Patient seen by provider today: No   present during visit today: Not Applicable.    Note: N/A.      Intravenous Access:  Peripheral IV placed by PICC RN    Treatment Conditions:  Not Applicable.      Post Infusion Assessment:  Patient tolerated infusion without incident.  Site patent and intact, free from redness, edema or discomfort.  Access discontinued per protocol.       Discharge Plan:   Patient and/or family verbalized understanding of discharge instructions and all questions answered.  Patient discharged in stable condition accompanied by: self.  Departure Mode: Ambulatory.      Barbara Jones RN

## 2023-09-11 ENCOUNTER — INFUSION THERAPY VISIT (OUTPATIENT)
Dept: INFUSION THERAPY | Facility: HOSPITAL | Age: 48
End: 2023-09-11
Payer: COMMERCIAL

## 2023-09-11 VITALS
DIASTOLIC BLOOD PRESSURE: 74 MMHG | SYSTOLIC BLOOD PRESSURE: 120 MMHG | RESPIRATION RATE: 16 BRPM | OXYGEN SATURATION: 97 % | TEMPERATURE: 98.3 F | HEART RATE: 66 BPM

## 2023-09-11 DIAGNOSIS — D50.9 IRON DEFICIENCY ANEMIA, UNSPECIFIED IRON DEFICIENCY ANEMIA TYPE: Primary | ICD-10-CM

## 2023-09-11 DIAGNOSIS — Z98.84 S/P GASTRIC BYPASS: ICD-10-CM

## 2023-09-11 PROCEDURE — 250N000011 HC RX IP 250 OP 636: Mod: JZ

## 2023-09-11 PROCEDURE — 96365 THER/PROPH/DIAG IV INF INIT: CPT

## 2023-09-11 PROCEDURE — 96366 THER/PROPH/DIAG IV INF ADDON: CPT

## 2023-09-11 PROCEDURE — 258N000003 HC RX IP 258 OP 636

## 2023-09-11 RX ORDER — ALBUTEROL SULFATE 0.83 MG/ML
2.5 SOLUTION RESPIRATORY (INHALATION)
Status: CANCELLED | OUTPATIENT
Start: 2023-09-12

## 2023-09-11 RX ORDER — ALBUTEROL SULFATE 90 UG/1
1-2 AEROSOL, METERED RESPIRATORY (INHALATION)
Status: CANCELLED
Start: 2023-09-12

## 2023-09-11 RX ORDER — DIPHENHYDRAMINE HYDROCHLORIDE 50 MG/ML
50 INJECTION INTRAMUSCULAR; INTRAVENOUS
Status: DISCONTINUED | OUTPATIENT
Start: 2023-09-11 | End: 2023-09-11

## 2023-09-11 RX ORDER — ALBUTEROL SULFATE 0.83 MG/ML
2.5 SOLUTION RESPIRATORY (INHALATION)
Status: DISCONTINUED | OUTPATIENT
Start: 2023-09-11 | End: 2023-09-11

## 2023-09-11 RX ORDER — EPINEPHRINE 1 MG/ML
0.3 INJECTION, SOLUTION INTRAMUSCULAR; SUBCUTANEOUS EVERY 5 MIN PRN
Status: DISCONTINUED | OUTPATIENT
Start: 2023-09-11 | End: 2023-09-11

## 2023-09-11 RX ORDER — EPINEPHRINE 1 MG/ML
0.3 INJECTION, SOLUTION INTRAMUSCULAR; SUBCUTANEOUS EVERY 5 MIN PRN
Status: CANCELLED | OUTPATIENT
Start: 2023-09-12

## 2023-09-11 RX ORDER — HEPARIN SODIUM,PORCINE 10 UNIT/ML
5-20 VIAL (ML) INTRAVENOUS DAILY PRN
Status: CANCELLED | OUTPATIENT
Start: 2023-09-12

## 2023-09-11 RX ORDER — DIPHENHYDRAMINE HYDROCHLORIDE 50 MG/ML
50 INJECTION INTRAMUSCULAR; INTRAVENOUS
Status: CANCELLED
Start: 2023-09-12

## 2023-09-11 RX ORDER — METHYLPREDNISOLONE SODIUM SUCCINATE 125 MG/2ML
125 INJECTION, POWDER, LYOPHILIZED, FOR SOLUTION INTRAMUSCULAR; INTRAVENOUS
Status: DISCONTINUED | OUTPATIENT
Start: 2023-09-11 | End: 2023-09-11

## 2023-09-11 RX ORDER — METHYLPREDNISOLONE SODIUM SUCCINATE 125 MG/2ML
125 INJECTION, POWDER, LYOPHILIZED, FOR SOLUTION INTRAMUSCULAR; INTRAVENOUS
Status: CANCELLED
Start: 2023-09-12

## 2023-09-11 RX ORDER — MEPERIDINE HYDROCHLORIDE 25 MG/ML
25 INJECTION INTRAMUSCULAR; INTRAVENOUS; SUBCUTANEOUS EVERY 30 MIN PRN
Status: CANCELLED | OUTPATIENT
Start: 2023-09-12

## 2023-09-11 RX ORDER — HEPARIN SODIUM (PORCINE) LOCK FLUSH IV SOLN 100 UNIT/ML 100 UNIT/ML
5 SOLUTION INTRAVENOUS
Status: CANCELLED | OUTPATIENT
Start: 2023-09-12

## 2023-09-11 RX ORDER — ALBUTEROL SULFATE 90 UG/1
1-2 AEROSOL, METERED RESPIRATORY (INHALATION)
Status: DISCONTINUED | OUTPATIENT
Start: 2023-09-11 | End: 2023-09-11

## 2023-09-11 RX ORDER — MEPERIDINE HYDROCHLORIDE 25 MG/ML
25 INJECTION INTRAMUSCULAR; INTRAVENOUS; SUBCUTANEOUS EVERY 30 MIN PRN
Status: DISCONTINUED | OUTPATIENT
Start: 2023-09-11 | End: 2023-09-11

## 2023-09-11 RX ADMIN — IRON SUCROSE 300 MG: 20 INJECTION, SOLUTION INTRAVENOUS at 14:15

## 2023-09-11 RX ADMIN — SODIUM CHLORIDE 250 ML: 9 INJECTION, SOLUTION INTRAVENOUS at 14:15

## 2023-09-11 ASSESSMENT — PAIN SCALES - GENERAL: PAINLEVEL: NO PAIN (0)

## 2023-09-11 NOTE — PROGRESS NOTES
Infusion Nursing Note:  Tiffanie Rosas presents today for 3/3 Venofer  Patient seen by provider today: No   present during visit today: Not Applicable.    Note: Tiffanie arrived ambulatory into the infusion center for 3/3 in a stable condition, VSS. Plan of car ereviewed, she verbalized understanding of her plan of care and acknowledged she has tolerated Iron infusion with no reaction or ill effect noted this is the last dose.    Intravenous Access:  Peripheral IV placed.    Treatment Conditions:  Not Applicable.    Post Infusion Assessment:  Patient tolerated infusion without incident.  Site patent and intact, free from redness, edema or discomfort.  No evidence of extravasations.  Access discontinued per protocol.     Discharge Plan:   Discharge instructions reviewed with: Patient.  Patient and/or family verbalized understanding of discharge instructions and all questions answered.  Patient discharged in stable condition accompanied by: self.  Departure Mode: Ambulatory.    Nessa Alonzo RN

## 2023-09-12 ENCOUNTER — PRE VISIT (OUTPATIENT)
Dept: ONCOLOGY | Facility: HOSPITAL | Age: 48
End: 2023-09-12
Payer: COMMERCIAL

## 2023-09-12 ENCOUNTER — ONCOLOGY VISIT (OUTPATIENT)
Dept: ONCOLOGY | Facility: HOSPITAL | Age: 48
End: 2023-09-12
Payer: COMMERCIAL

## 2023-09-12 ENCOUNTER — LAB (OUTPATIENT)
Dept: INFUSION THERAPY | Facility: HOSPITAL | Age: 48
End: 2023-09-12
Payer: COMMERCIAL

## 2023-09-12 VITALS
BODY MASS INDEX: 30.27 KG/M2 | TEMPERATURE: 98.9 F | DIASTOLIC BLOOD PRESSURE: 75 MMHG | RESPIRATION RATE: 18 BRPM | HEART RATE: 75 BPM | SYSTOLIC BLOOD PRESSURE: 125 MMHG | OXYGEN SATURATION: 100 % | HEIGHT: 64 IN | WEIGHT: 177.3 LBS

## 2023-09-12 DIAGNOSIS — R63.4 WEIGHT LOSS: ICD-10-CM

## 2023-09-12 DIAGNOSIS — D50.8 OTHER IRON DEFICIENCY ANEMIA: ICD-10-CM

## 2023-09-12 DIAGNOSIS — R79.89 INCREASED PLATELET COUNT: ICD-10-CM

## 2023-09-12 DIAGNOSIS — D64.9 ANEMIA, UNSPECIFIED TYPE: Primary | ICD-10-CM

## 2023-09-12 DIAGNOSIS — D50.8 OTHER IRON DEFICIENCY ANEMIA: Primary | ICD-10-CM

## 2023-09-12 LAB
ALBUMIN UR-MCNC: NEGATIVE MG/DL
APPEARANCE UR: CLEAR
BILIRUB UR QL STRIP: NEGATIVE
COLOR UR AUTO: NORMAL
GLUCOSE UR STRIP-MCNC: NEGATIVE MG/DL
HGB UR QL STRIP: NEGATIVE
KETONES UR STRIP-MCNC: NEGATIVE MG/DL
LEUKOCYTE ESTERASE UR QL STRIP: NEGATIVE
NITRATE UR QL: NEGATIVE
PH UR STRIP: 6.5 [PH] (ref 5–7)
SP GR UR STRIP: 1.02 (ref 1–1.03)
UROBILINOGEN UR STRIP-MCNC: <2 MG/DL

## 2023-09-12 PROCEDURE — 36415 COLL VENOUS BLD VENIPUNCTURE: CPT

## 2023-09-12 PROCEDURE — 86364 TISS TRNSGLTMNASE EA IG CLAS: CPT | Mod: 59

## 2023-09-12 PROCEDURE — 99204 OFFICE O/P NEW MOD 45 MIN: CPT | Performed by: INTERNAL MEDICINE

## 2023-09-12 PROCEDURE — 81003 URINALYSIS AUTO W/O SCOPE: CPT

## 2023-09-12 PROCEDURE — G0463 HOSPITAL OUTPT CLINIC VISIT: HCPCS | Performed by: INTERNAL MEDICINE

## 2023-09-12 ASSESSMENT — PAIN SCALES - GENERAL: PAINLEVEL: NO PAIN (0)

## 2023-09-12 NOTE — PROGRESS NOTES
"Oncology Rooming Note    September 12, 2023 2:48 PM   Tiffanie Rosas is a 48 year old female who presents for:    Chief Complaint   Patient presents with    Hematology     New Patient - Increased platelet count, Other iron deficiency anemia     Initial Vitals: /75 (BP Location: Left arm, Patient Position: Sitting, Cuff Size: Adult Regular)   Pulse 75   Temp 98.9  F (37.2  C) (Oral)   Resp 18   Ht 1.626 m (5' 4\")   Wt 80.4 kg (177 lb 4.8 oz)   SpO2 100%   BMI 30.43 kg/m   Estimated body mass index is 30.43 kg/m  as calculated from the following:    Height as of this encounter: 1.626 m (5' 4\").    Weight as of this encounter: 80.4 kg (177 lb 4.8 oz). Body surface area is 1.91 meters squared.  No Pain (0) Comment: Data Unavailable   No LMP recorded.  Allergies reviewed: Yes  Medications reviewed: Yes    Medications: Medication refills not needed today.  Pharmacy name entered into [facility]: [id number] - [place], [place] - [address]    Clinical concerns:  New Patient - Increased platelet count, Other iron deficiency anemia      Monalisa Webb, ASHANTI            "

## 2023-09-12 NOTE — LETTER
"    9/12/2023         RE: Tiffanie Rosas  [address] [facility] [address]        Dear Colleague,    Thank you for referring your patient, Tiffanie Rosas, to the [facility] [place]. Please see a copy of my visit note below.    Oncology Rooming Note    September 12, 2023 2:48 PM   Tiffanie Rosas is a 48 year old female who presents for:    Chief Complaint   Patient presents with     Hematology     New Patient - Increased platelet count, Other iron deficiency anemia     Initial Vitals: /75 (BP Location: Left arm, Patient Position: Sitting, Cuff Size: Adult Regular)   Pulse 75   Temp 98.9  F (37.2  C) (Oral)   Resp 18   Ht 1.626 m (5' 4\")   Wt 80.4 kg (177 lb 4.8 oz)   SpO2 100%   BMI 30.43 kg/m   Estimated body mass index is 30.43 kg/m  as calculated from the following:    Height as of this encounter: 1.626 m (5' 4\").    Weight as of this encounter: 80.4 kg (177 lb 4.8 oz). Body surface area is 1.91 meters squared.  No Pain (0) Comment: Data Unavailable   No LMP recorded.  Allergies reviewed: Yes  Medications reviewed: Yes    Medications: Medication refills not needed today.  Pharmacy name entered into [employer]: [id number] - [facility] [address]    Clinical concerns:  New Patient - Increased platelet count, Other iron deficiency anemia      Monalisa Webb, [facility] Hematology and Oncology Consult Note    Patient: Tiffanie Rosas  MRN: [id number]  Date of Service: 09/12/2023      Reason for Visit    Chief Complaint   Patient presents with     Hematology     New Patient - Increased platelet count, Other iron deficiency anemia       Assessment/Plan    Iron deficiency anemia probably secondary to malabsorption  History of gastric bypass many years ago  Reactive thrombocytosis  Hypothyroidism    Patient found recently to have profound microcytic anemia.  This has developed over the last 3 years.  No history of any bleeding. "  No change in her diet.  No new abdominal symptoms.  Remote history of gastric bypass.    Most likely she has developed iron deficiency anemia secondary to malabsorption related to the gastric bypass.  We will check urinalysis, blood work for celiac disease and stool for occult blood.  She has already received Venofer for 3 doses.    Should have CBC rechecked in the next 2 to 3 months along with ferritin to assess response to Venofer.  Okay to try oral iron 325 mg p.o. daily although this may not benefit her if she has malabsorption.  Best to recheck her CBC and ferritin every 6 to 12 months as she will likely need iron replacement again in the future.    Based on her age should follow-up with primary for breast cancer and colon cancer screening.    Thrombocytosis is likely reactive to iron deficiency anemia.  No additional work-up at this time.  Expect that this will normalize with correction of the iron deficiency.  May need additional work-up if this does not normalize.    Questions answered.    Plan: Check blood work for celiac disease, urinalysis and stool for occult blood  Okay to try ferrous sulfate 325 mg p.o. daily  Follow-up with primary to recheck CBC and ferritin in 2 to 3 months to assess response and then q. 6 to 12 months as she may need iron again in the future  Needs breast and colon cancer screening      ECOG Performance    0 - Independent    Problem List    Patient Active Problem List   Diagnosis     S/P gastric bypass     CARDIOVASCULAR SCREENING; LDL GOAL LESS THAN 160     Hypothyroidism     Obesity     Nicotine addiction     Alcoholism in remission (H)     Anemia     Carpal tunnel syndrome     Increased platelet count     ______________________________________________________________________________    Staging History    Cancer Staging   No matching staging information was found for the patient.    History    Patient is a 48-year-old with past history of hypothyroidism and history of gastric  bypass referred for evaluation of microcytic anemia and thrombocytosis.  Recently found to have hemoglobin of less than 7 and was sent to the ER and was transfused.  Work-up has shown low iron and low ferritin and the patient is also received 3 doses of IV iron.  Referred due to note of thrombocytosis.    Overall patient feels very well.  Had gastric bypass 20 years ago.  No change in her diet which is well-balanced and regular.  Denies any blood in the stool or urine.  Last menstrual periods where in March.  No nausea or vomiting.  Good appetite.  Normal bowel movements.  No blood in the stool or urine.  Has lost about 60 pounds but attributes this to her workplace and more healthy diet.    Recently treated for a peritonsillar abscess with antibiotics.  Surgeries include left wrist cyst removal and oral surgery on her chin and the gastric bypass.  No other hospitalizations.    Lives in [place] she is single with a child.  Smoked briefly for a couple of years many years ago.  No alcohol since 2013.  She is adopted so does not know about her family history.    Past History    Past Medical History:   Diagnosis Date     Alcoholism (H) 2005    sober since 07/2012     Essential hypertension 12/4/2012     Thyroid disease     Family History   Adopted: Yes   Problem Relation Age of Onset     Depression Mother         adopted     Diabetes No family hx of      Cancer No family hx of      Heart Disease No family hx of       Past Surgical History:   Procedure Laterality Date     DAVINCI BYPASS GASTRIC DIONISIO-EN-Y  2003     GI SURGERY      gastric bipass surgery    Social History     Socioeconomic History     Marital status: Single     Spouse name: Not on file     Number of children: Not on file     Years of education: Not on file     Highest education level: Not on file   Occupational History     Not on file   Tobacco Use     Smoking status: Former     Packs/day: 0.30     Types: Cigarettes     Passive exposure: Past      "Smokeless tobacco: Never   Vaping Use     Vaping Use: Every day     Substances: Nicotine     Devices: Refillable tank   Substance and Sexual Activity     Alcohol use: Not Currently     Drug use: Not Currently     Sexual activity: Not on file   Other Topics Concern     Parent/sibling w/ CABG, MI or angioplasty before 65F 55M? Not Asked   Social History Narrative     Not on file     Social Determinants of Health     Financial Resource Strain: Not on file   Food Insecurity: Not on file   Transportation Needs: Not on file   Physical Activity: Not on file   Stress: Not on file   Social Connections: Not on file   Intimate Partner Violence: Not on file   Housing Stability: Not on file        Allergies    Allergies   Allergen Reactions     Septra [Sulfamethoxazole W/Trimethoprim] Rash     Sulfamethoxazole-Trimethoprim Itching and Rash       Review of Systems          Physical Exam        9/12/2023     2:45 PM   Oncology Vitals   Height 163 cm   Weight 80.423 kg   BSA (m2) 1.91 m2   /75   Pulse 75   Temp 98.9  F (37.2  C)   Temp src Oral   SpO2 100 %   Pain Score 0 (None)       {Exam, Complete:45177}  HEENT: {Exam; heent:66482:p}  Lymphatics: {Exam; lymphatics:80070:p}  Chest: {Exam; chest:14879:p}  Abdomen: {Exam; abdomen:215353:p}  Extremities: {Exam; extremities:80195:p}  Skin: {exam; skin:12941:p:\"normal\"}  CNS: ***    Lab Results    No results found for this or any previous visit (from the past 168 hour(s)).    Imaging Results    No results found.      Signed by: Ana Vidal MD      Again, thank you for allowing me to participate in the care of your patient.        Sincerely,        Ana Vidal MD  "

## 2023-09-12 NOTE — PROGRESS NOTES
[facility] Hematology and Oncology Consult Note    Patient: Tiffanie Rosas  MRN: [id number]  Date of Service: 09/12/2023      Reason for Visit    Chief Complaint   Patient presents with    Hematology     New Patient - Increased platelet count, Other iron deficiency anemia       Assessment/Plan    Iron deficiency anemia probably secondary to malabsorption  History of gastric bypass many years ago  Reactive thrombocytosis  Hypothyroidism    Patient found recently to have profound microcytic anemia.  This has developed over the last 3 years.  No history of any bleeding.  No change in her diet.  No new abdominal symptoms.  Remote history of gastric bypass.    Most likely she has developed iron deficiency anemia secondary to malabsorption related to the gastric bypass.  We will check urinalysis, blood work for celiac disease and stool for occult blood.  She has already received Venofer for 3 doses.    Should have CBC rechecked in the next 2 to 3 months along with ferritin to assess response to Venofer.  Okay to try oral iron 325 mg p.o. daily although this may not benefit her if she has malabsorption.  Best to recheck her CBC and ferritin every 6 to 12 months as she will likely need iron replacement again in the future.    Based on her age should follow-up with primary for breast cancer and colon cancer screening.    Thrombocytosis is likely reactive to iron deficiency anemia.  No additional work-up at this time.  Expect that this will normalize with correction of the iron deficiency.  May need additional work-up if this does not normalize.    Questions answered.    Plan: Check blood work for celiac disease, urinalysis and stool for occult blood  Okay to try ferrous sulfate 325 mg p.o. daily  Follow-up with primary to recheck CBC and ferritin in 2 to 3 months to assess response and then q. 6 to 12 months as she may need iron again in the future  Needs breast and colon cancer screening      ECOG Performance    0  - Independent    Problem List    Patient Active Problem List   Diagnosis    S/P gastric bypass    CARDIOVASCULAR SCREENING; LDL GOAL LESS THAN 160    Hypothyroidism    Obesity    Nicotine addiction    Alcoholism in remission (H)    Anemia    Carpal tunnel syndrome    Increased platelet count     ______________________________________________________________________________    Staging History    Cancer Staging   No matching staging information was found for the patient.    History    Patient is a 48-year-old with past history of hypothyroidism and history of gastric bypass referred for evaluation of microcytic anemia and thrombocytosis.  Recently found to have hemoglobin of less than 7 and was sent to the ER and was transfused.  Work-up has shown low iron and low ferritin and the patient is also received 3 doses of IV iron.  Referred due to note of thrombocytosis.    Overall patient feels very well.  Had gastric bypass 20 years ago.  No change in her diet which is well-balanced and regular.  Denies any blood in the stool or urine.  Last menstrual periods where in March.  No nausea or vomiting.  Good appetite.  Normal bowel movements.  No blood in the stool or urine.  Has lost about 60 pounds but attributes this to her workplace and more healthy diet.    Recently treated for a peritonsillar abscess with antibiotics.  Surgeries include left wrist cyst removal and oral surgery on her chin and the gastric bypass.  No other hospitalizations.    Lives in [place] she is single with a child.  Smoked briefly for a couple of years many years ago.  No alcohol since 2013.  She is adopted so does not know about her family history.    Past History    Past Medical History:   Diagnosis Date    Alcoholism (H) 2005    sober since 07/2012    Essential hypertension 12/4/2012    Thyroid disease     Family History   Adopted: Yes   Problem Relation Age of Onset    Depression Mother         adopted    Diabetes No family hx of     Cancer No  family hx of     Heart Disease No family hx of       Past Surgical History:   Procedure Laterality Date    DAVINCI BYPASS GASTRIC DIONISIO-EN-Y  2003    GI SURGERY      gastric bipass surgery    Social History     Socioeconomic History    Marital status: Single     Spouse name: Not on file    Number of children: Not on file    Years of education: Not on file    Highest education level: Not on file   Occupational History    Not on file   Tobacco Use    Smoking status: Former     Packs/day: 0.30     Types: Cigarettes     Passive exposure: Past    Smokeless tobacco: Never   Vaping Use    Vaping Use: Every day    Substances: Nicotine    Devices: Refillable tank   Substance and Sexual Activity    Alcohol use: Not Currently    Drug use: Not Currently    Sexual activity: Not on file   Other Topics Concern    Parent/sibling w/ CABG, MI or angioplasty before 65F 55M? Not Asked   Social History Narrative    Not on file     Social Determinants of Health     Financial Resource Strain: Not on file   Food Insecurity: Not on file   Transportation Needs: Not on file   Physical Activity: Not on file   Stress: Not on file   Social Connections: Not on file   Intimate Partner Violence: Not on file   Housing Stability: Not on file        Allergies    Allergies   Allergen Reactions    Septra [Sulfamethoxazole W/Trimethoprim] Rash    Sulfamethoxazole-Trimethoprim Itching and Rash       Review of Systems          Physical Exam        9/12/2023     2:45 PM   Oncology Vitals   Height 163 cm   Weight 80.423 kg   BSA (m2) 1.91 m2   /75   Pulse 75   Temp 98.9  F (37.2  C)   Temp src Oral   SpO2 100 %   Pain Score 0 (None)       Lab Results    No results found for this or any previous visit (from the past 168 hour(s)).    Imaging Results    No results found.      Signed by: Ana Vidal MD

## 2023-09-13 LAB
TTG IGA SER-ACNC: 0.6 U/ML
TTG IGG SER-ACNC: <0.6 U/ML

## 2023-09-14 ENCOUNTER — OFFICE VISIT (OUTPATIENT)
Dept: FAMILY MEDICINE | Facility: CLINIC | Age: 48
End: 2023-09-14
Payer: COMMERCIAL

## 2023-09-14 VITALS
HEART RATE: 58 BPM | OXYGEN SATURATION: 100 % | SYSTOLIC BLOOD PRESSURE: 117 MMHG | DIASTOLIC BLOOD PRESSURE: 75 MMHG | TEMPERATURE: 97.4 F | WEIGHT: 177.2 LBS | BODY MASS INDEX: 30.42 KG/M2

## 2023-09-14 DIAGNOSIS — J36 PERITONSILLAR ABSCESS: Primary | ICD-10-CM

## 2023-09-14 PROCEDURE — 99213 OFFICE O/P EST LOW 20 MIN: CPT | Mod: GC

## 2023-09-14 NOTE — PROGRESS NOTES
Preceptor Attestation:  Vitals:    09/14/23 1551   BP: 117/75   BP Location: Right arm   Patient Position: Sitting   Cuff Size: Adult Regular   Pulse: 58   Temp: 97.4  F (36.3  C)   TempSrc: Tympanic   SpO2: 100%   Weight: 80.4 kg (177 lb 3.2 oz)          I discussed the patient with the resident and evaluated the patient in person. I have verified the content of the note, which accurately reflects my assessment of the patient and the plan of care.   Supervising Physician:  Orly Hoffman MD

## 2023-09-14 NOTE — PROGRESS NOTES
Assessment & Plan     Peritonsillar abscess  Patient reports having peritonsillar abscesses drained x3 in the [facility] ED on 9/3.  She was given IV steroids and antibiotics and sent home with 10 days of Augmentin.  She is feeling much better now with no difficulty breathing or swallowing.  She completed the Augmentin course this morning.  No concerning findings on exam including no nasopharyngeal swelling or swelling of the neck.  -Augmentin course completed  -Follow-up as needed       MED REC REQUIRED  Post Medication Reconciliation Status: discharge medications reconciled and changed, per note/orders    Manuel Wolf MD  [facility]    Edgardo Moreno is a 48 year old, presenting for the following health issues:  ER F/U      9/14/2023     3:50 PM   Additional Questions   Roomed by cook   Accompanied by self       HPI       ED Follow-up Visit:    Hospital/Nursing Home/IP Rehab Facility:  [facility]  Date of Admission: 9/3/2023  Date of Discharge: 9/3/2023  Reason(s) for Admission: Peritonsillar abscess    Was your hospitalization related to COVID-19? No   Problems taking medications regularly:  None  Problems adhering to non-medication therapy:  None    Summary of hospitalization:  CareEverywhere information obtained and reviewed  Diagnostic Tests/Treatments reviewed.  Follow up needed: none  Other Healthcare Providers Involved in Patient s Care:         None  Update since discharge: improved.     Plan of care communicated with patient       Review of Systems   Constitutional, HEENT, cardiovascular, pulmonary, gi and gu systems are negative, except as otherwise noted.      Objective    /75 (BP Location: Right arm, Patient Position: Sitting, Cuff Size: Adult Regular)   Pulse 58   Temp 97.4  F (36.3  C) (Tympanic)   Wt 80.4 kg (177 lb 3.2 oz)   SpO2 100%   BMI 30.42 kg/m    Body mass index is 30.42 kg/m .  Physical Exam   GENERAL: healthy, alert and no distress  HENT: nose and  mouth without ulcers or lesions  NECK: no adenopathy, no asymmetry, masses, or scars and thyroid normal to palpation      ----- Service Performed and Documented by Resident or Fellow ------

## 2023-09-21 ENCOUNTER — LAB (OUTPATIENT)
Dept: INFUSION THERAPY | Facility: HOSPITAL | Age: 48
End: 2023-09-21
Attending: INTERNAL MEDICINE
Payer: COMMERCIAL

## 2023-09-21 DIAGNOSIS — D50.8 OTHER IRON DEFICIENCY ANEMIA: ICD-10-CM

## 2023-09-21 DIAGNOSIS — D64.9 ANEMIA, UNSPECIFIED TYPE: Primary | ICD-10-CM

## 2023-09-21 LAB
HEMOCCULT STL QL: NEGATIVE

## 2023-09-21 PROCEDURE — 82272 OCCULT BLD FECES 1-3 TESTS: CPT | Performed by: INTERNAL MEDICINE

## 2023-09-21 PROCEDURE — 82272 OCCULT BLD FECES 1-3 TESTS: CPT

## 2023-10-06 ENCOUNTER — OFFICE VISIT (OUTPATIENT)
Dept: FAMILY MEDICINE | Facility: CLINIC | Age: 48
End: 2023-10-06
Payer: COMMERCIAL

## 2023-10-06 ENCOUNTER — MYC MEDICAL ADVICE (OUTPATIENT)
Dept: FAMILY MEDICINE | Facility: CLINIC | Age: 48
End: 2023-10-06

## 2023-10-06 VITALS
DIASTOLIC BLOOD PRESSURE: 78 MMHG | OXYGEN SATURATION: 98 % | BODY MASS INDEX: 30.62 KG/M2 | WEIGHT: 178.4 LBS | TEMPERATURE: 97.9 F | HEART RATE: 84 BPM | SYSTOLIC BLOOD PRESSURE: 112 MMHG

## 2023-10-06 DIAGNOSIS — R42 LIGHTHEADEDNESS: ICD-10-CM

## 2023-10-06 DIAGNOSIS — D50.9 IRON DEFICIENCY ANEMIA, UNSPECIFIED IRON DEFICIENCY ANEMIA TYPE: Primary | ICD-10-CM

## 2023-10-06 LAB
ANION GAP SERPL CALCULATED.3IONS-SCNC: 10 MMOL/L (ref 7–15)
BASO+EOS+MONOS # BLD AUTO: ABNORMAL 10*3/UL
BASO+EOS+MONOS NFR BLD AUTO: ABNORMAL %
BASOPHILS # BLD AUTO: 0.1 10E3/UL (ref 0–0.2)
BASOPHILS NFR BLD AUTO: 1 %
BUN SERPL-MCNC: 9.3 MG/DL (ref 6–20)
CALCIUM SERPL-MCNC: 9.4 MG/DL (ref 8.6–10)
CHLORIDE SERPL-SCNC: 105 MMOL/L (ref 98–107)
CREAT SERPL-MCNC: 0.54 MG/DL (ref 0.51–0.95)
DEPRECATED HCO3 PLAS-SCNC: 27 MMOL/L (ref 22–29)
EGFRCR SERPLBLD CKD-EPI 2021: >90 ML/MIN/1.73M2
EOSINOPHIL # BLD AUTO: 0.1 10E3/UL (ref 0–0.7)
EOSINOPHIL NFR BLD AUTO: 2 %
ERYTHROCYTE [DISTWIDTH] IN BLOOD BY AUTOMATED COUNT: ABNORMAL %
FERRITIN SERPL-MCNC: 49 NG/ML (ref 6–175)
GLUCOSE SERPL-MCNC: 94 MG/DL (ref 70–99)
HCT VFR BLD AUTO: 37 % (ref 35–47)
HGB BLD-MCNC: 11.2 G/DL (ref 11.7–15.7)
IMM GRANULOCYTES # BLD: 0 10E3/UL
IMM GRANULOCYTES NFR BLD: 0 %
IRON BINDING CAPACITY (ROCHE): 278 UG/DL (ref 240–430)
IRON SATN MFR SERPL: 17 % (ref 15–46)
IRON SERPL-MCNC: 46 UG/DL (ref 37–145)
LYMPHOCYTES # BLD AUTO: 1.3 10E3/UL (ref 0.8–5.3)
LYMPHOCYTES NFR BLD AUTO: 29 %
MCH RBC QN AUTO: 22.4 PG (ref 26.5–33)
MCHC RBC AUTO-ENTMCNC: 30.3 G/DL (ref 31.5–36.5)
MCV RBC AUTO: 74 FL (ref 78–100)
MONOCYTES # BLD AUTO: 0.4 10E3/UL (ref 0–1.3)
MONOCYTES NFR BLD AUTO: 9 %
NEUTROPHILS # BLD AUTO: 2.6 10E3/UL (ref 1.6–8.3)
NEUTROPHILS NFR BLD AUTO: 59 %
PLATELET # BLD AUTO: 294 10E3/UL (ref 150–450)
POTASSIUM SERPL-SCNC: 5 MMOL/L (ref 3.4–5.3)
RBC # BLD AUTO: 5 10E6/UL (ref 3.8–5.2)
SODIUM SERPL-SCNC: 142 MMOL/L (ref 135–145)
WBC # BLD AUTO: 4.4 10E3/UL (ref 4–11)

## 2023-10-06 PROCEDURE — 83550 IRON BINDING TEST: CPT

## 2023-10-06 PROCEDURE — 83540 ASSAY OF IRON: CPT

## 2023-10-06 PROCEDURE — 82728 ASSAY OF FERRITIN: CPT

## 2023-10-06 PROCEDURE — 99214 OFFICE O/P EST MOD 30 MIN: CPT | Mod: GC

## 2023-10-06 PROCEDURE — 80048 BASIC METABOLIC PNL TOTAL CA: CPT

## 2023-10-06 PROCEDURE — 85025 COMPLETE CBC W/AUTO DIFF WBC: CPT

## 2023-10-06 PROCEDURE — 36415 COLL VENOUS BLD VENIPUNCTURE: CPT

## 2023-10-06 NOTE — PROGRESS NOTES
Preceptor Attestation:  Vitals:    10/06/23 0951   BP: 112/78   BP Location: Left arm   Patient Position: Sitting   Cuff Size: Adult Large   Pulse: 84   Temp: 97.9  F (36.6  C)   TempSrc: Oral   SpO2: 98%   Weight: 80.9 kg (178 lb 6.4 oz)          I discussed the patient with the resident and evaluated the patient in person. I have verified the content of the note, which accurately reflects my assessment of the patient and the plan of care.   Supervising Physician:  Orly Hoffman MD

## 2023-10-06 NOTE — LETTER
October 6, 2023      Re: Tiffanie HARE Alison      To whom it concerns,     Tiffanie was seen in clinic today. She can return to work tomorrow 10/7 without restrictions.     Sincerely,      Taj Beebe MD

## 2023-10-06 NOTE — PROGRESS NOTES
Assessment & Plan     Iron deficiency anemia, unspecified iron deficiency anemia type  Significant iron deficiency anemia with reactive thrombocytosis per hematology. S/p 3 IV venofer infusions, last one month ago. Feeling better. Will recheck labs as below. Hx of gastric bypass. Declined colonoscopy.   - CBC with Platelets & Differential  - Ferritin  - Iron & Iron Binding Capacity    Lightheadedness  Feeling better since IV iron transfusions. Work up this morning feeling lightheaded, improved after eating breakfast. Now has headache, neuro exam normal. Working more hours this week. Will also check electrolytes.   - Basic metabolic panel      Gwendolyn Beebe MD PGY3  [facility] Residency  10/06/23    I precepted today with Dr. Dalton Reynoso   Tiffanie is a 48 year old, presenting for the following health issues:  Follow Up (Hemoglobin check. )      HPI     She is overall feeling better since getting her IV iron transfusions. The last one was 4 weeks ago. Pica since resolved. She would like to get iron and hemoglobin rechecked. Discussed importance of colonoscopy to assess for etiology of iron deficiency anemia. Patient declines this today. Did have 3 negative FOBT tests by hematology, negative celiac test. Hx of gastric bypass.     Lightheadedness this morning when she woke up. She has been working mandatory overtime this week. Lightheadedness improved with eating breakfast. Went to work this morning and then got a headache which is abnormal for her. Throbbing, located behind eye. No vision changes. Associated photophobia and phonophobia. No neuro deficits.     Review of Systems   Constitutional, HEENT, cardiovascular, pulmonary, gi and gu systems are negative, except as otherwise noted.      Objective    /78 (BP Location: Left arm, Patient Position: Sitting, Cuff Size: Adult Large)   Pulse 84   Temp 97.9  F (36.6  C) (Oral)   Wt 80.9 kg (178 lb 6.4 oz)   SpO2 98%   BMI 30.62 kg/m     Body mass index is 30.62 kg/m .  Physical Exam   GENERAL: healthy, alert and no distress  EYES: Eyes grossly normal to inspection, PERRL and conjunctivae and sclerae normal. No nystagmus.   RESP: lungs clear to auscultation - no rales, rhonchi or wheezes  CV: regular rate and rhythm, normal S1 S2, no S3 or S4, no murmur  MS: no gross musculoskeletal defects noted, no edema  NEURO: grossly normal strength and tone, mentation intact and speech normal. CN 2-12 intact

## 2023-10-06 NOTE — TELEPHONE ENCOUNTER
Called patient and left message that she could make an appointment to be seen this morning.     Gwendolyn Beebe MD PGY3  [facility] Residency  10/06/23

## 2023-11-25 ENCOUNTER — HEALTH MAINTENANCE LETTER (OUTPATIENT)
Age: 48
End: 2023-11-25

## 2024-06-22 ENCOUNTER — HEALTH MAINTENANCE LETTER (OUTPATIENT)
Age: 49
End: 2024-06-22

## 2025-02-05 NOTE — TELEPHONE ENCOUNTER
Keira Hoover  : 1954  Primary: Medicare Part A And B (Medicare)  Secondary: [employer] MEDICARE SUPP [facility] @ [address]  [facility] [address]  Phone: [phone]  Fax: [phone]    Plan of Care/Certification Expiration Date:  Certification Period Expiration Date: 25      Frequency/Duration:   Plan Frequency: 1x/week for 90 days      Time In/Out:   Time In: 1440  Time Out: 1530    OT Visit Info:  Plan Frequency: 1x/week for 90 days  Progress Note Due Date: 25  Total # of Visits to Date: 2  Progress Note Counter: 2       Visit Count: 2   OUTPATIENT OCCUPATIONAL THERAPY: Treatment Note 2025  Episode: (Lymphedema)         Treatment Diagnosis:    Lymphedema, not elsewhere classified  Medical/Referring Diagnosis:   Supraclavicular fossa fullness   Referring Physician:  Annabelle Cai MD MD Orders:  OT Eval and Treat   Return MD Appt:  unknown   Date of Onset:  Onset Date: 24 (Left reverse TSA)     Allergies:  Adhesive tape  Restrictions/Precautions:   Lymphedema precautions      Medications Last Reviewed:  2025     Interventions Planned (Treatment may consist of any combination of the following):     See Assessment Note      Subjective Comments:   Patient reports she isn't sure but thinks the swelling seems better.    >Initial Pain Level:     0/10  >Post Session Pain Level:     0/10  Medications Last Reviewed:  2025  Updated Objective Findings:  None Today  Treatment   MANUAL THERAPY: (45 minutes):   Manual lymphatic drainage was utilized and necessary because of the patient's  edema in upper chest and left UE .   Manual Lymph Drainage: of the upper chest  Lymph Nodes:    Cervical Supraclavicular Axillary Abdominal Inguinal Popliteal Antecubital   RIGHT [x]     [x]     [x]     []     []     []     []       LEFT [x]     [x]     [x]     []     []     []     [x]         Anastamoses:   Axillo-axillary  RN cannot approve Refill Request    RN can NOT refill this medication because patient is overdue for TSH recheck.  last TSH 3/7/19 was low and provider reduced dose and instructied 6 week follow up recheck.. Last office visit: Visit date not found Last Physical: Visit date not found Last MTM visit: Visit date not found Last visit same specialty: 3/7/2019 Rodrick Finney MD.  Next visit within 3 mo: Visit date not found  Next physical within 3 mo: Visit date not found      Brandie Boone, [facility] Connection Triage/Med Refill 6/11/2019    Requested Prescriptions   Pending Prescriptions Disp Refills     levothyroxine (SYNTHROID, LEVOTHROID) 175 MCG tablet [Pharmacy Med Name: LEVOTHYROXIN 175MCG TAB] 90 tablet 0     Sig: TAKE 1 TABLET BY MOUTH ONCE DAILY BEFORE BREAKFAST       Thyroid Hormones Protocol Passed - 6/10/2019  5:31 AM        Passed - Provider visit in past 12 months or next 3 months     Last office visit with prescriber/PCP: Visit date not found OR same dept: 3/7/2019 Rodrick Finney MD OR same specialty: 3/7/2019 Rodrick Finney MD  Last physical: Visit date not found Last MTM visit: Visit date not found   Next visit within 3 mo: Visit date not found  Next physical within 3 mo: Visit date not found  Prescriber OR PCP: Miriam Warren MD  Last diagnosis associated with med order: 1. Hypothyroidism, unspecified type  - levothyroxine (SYNTHROID, LEVOTHROID) 175 MCG tablet [Pharmacy Med Name: LEVOTHYROXIN 175MCG TAB]; TAKE 1 TABLET BY MOUTH ONCE DAILY BEFORE BREAKFAST  Dispense: 90 tablet; Refill: 0    If protocol passes may refill for 12 months if within 3 months of last provider visit (or a total of 15 months).             Passed - TSH on file in past 12 months for patient age 12 & older     TSH   Date Value Ref Range Status   03/07/2019 0.11 (L) 0.30 - 5.00 uIU/mL Final

## 2025-07-12 ENCOUNTER — HEALTH MAINTENANCE LETTER (OUTPATIENT)
Age: 50
End: 2025-07-12